# Patient Record
Sex: MALE | Race: WHITE | URBAN - METROPOLITAN AREA
[De-identification: names, ages, dates, MRNs, and addresses within clinical notes are randomized per-mention and may not be internally consistent; named-entity substitution may affect disease eponyms.]

---

## 2017-01-01 ENCOUNTER — INPATIENT (INPATIENT)
Facility: HOSPITAL | Age: 72
LOS: 3 days | DRG: 23 | End: 2017-09-05
Attending: INTERNAL MEDICINE | Admitting: NEUROLOGICAL SURGERY
Payer: COMMERCIAL

## 2017-01-01 ENCOUNTER — EMERGENCY (EMERGENCY)
Facility: HOSPITAL | Age: 72
LOS: 1 days | Discharge: HOME | End: 2017-01-01
Admitting: EMERGENCY MEDICINE
Payer: COMMERCIAL

## 2017-01-01 VITALS
HEART RATE: 116 BPM | OXYGEN SATURATION: 96 % | SYSTOLIC BLOOD PRESSURE: 173 MMHG | TEMPERATURE: 98 F | DIASTOLIC BLOOD PRESSURE: 101 MMHG | RESPIRATION RATE: 28 BRPM

## 2017-01-01 VITALS
OXYGEN SATURATION: 85 % | TEMPERATURE: 99 F | DIASTOLIC BLOOD PRESSURE: 74 MMHG | RESPIRATION RATE: 16 BRPM | HEART RATE: 113 BPM | SYSTOLIC BLOOD PRESSURE: 125 MMHG

## 2017-01-01 DIAGNOSIS — I61.9 NONTRAUMATIC INTRACEREBRAL HEMORRHAGE, UNSPECIFIED: ICD-10-CM

## 2017-01-01 DIAGNOSIS — Z71.89 OTHER SPECIFIED COUNSELING: ICD-10-CM

## 2017-01-01 DIAGNOSIS — K11.7 DISTURBANCES OF SALIVARY SECRETION: ICD-10-CM

## 2017-01-01 DIAGNOSIS — R53.81 OTHER MALAISE: ICD-10-CM

## 2017-01-01 DIAGNOSIS — G20 PARKINSON'S DISEASE: ICD-10-CM

## 2017-01-01 DIAGNOSIS — Z51.5 ENCOUNTER FOR PALLIATIVE CARE: ICD-10-CM

## 2017-01-01 LAB
ALBUMIN SERPL ELPH-MCNC: 3.7 G/DL — SIGNIFICANT CHANGE UP (ref 3.3–5)
ALBUMIN SERPL ELPH-MCNC: 4.5 G/DL — SIGNIFICANT CHANGE UP (ref 3.3–5)
ALP SERPL-CCNC: 69 U/L — SIGNIFICANT CHANGE UP (ref 40–120)
ALP SERPL-CCNC: 76 U/L — SIGNIFICANT CHANGE UP (ref 40–120)
ALT FLD-CCNC: 20 U/L RC — SIGNIFICANT CHANGE UP (ref 10–45)
ALT FLD-CCNC: 20 U/L RC — SIGNIFICANT CHANGE UP (ref 10–45)
ANION GAP SERPL CALC-SCNC: 16 MMOL/L — SIGNIFICANT CHANGE UP (ref 5–17)
ANION GAP SERPL CALC-SCNC: 16 MMOL/L — SIGNIFICANT CHANGE UP (ref 5–17)
ANION GAP SERPL CALC-SCNC: 19 MMOL/L — HIGH (ref 5–17)
APPEARANCE UR: CLEAR — SIGNIFICANT CHANGE UP
APTT BLD: 27 SEC — LOW (ref 27.5–37.4)
AST SERPL-CCNC: 24 U/L — SIGNIFICANT CHANGE UP (ref 10–40)
AST SERPL-CCNC: 25 U/L — SIGNIFICANT CHANGE UP (ref 10–40)
BASOPHILS # BLD AUTO: 0 K/UL — SIGNIFICANT CHANGE UP (ref 0–0.2)
BASOPHILS # BLD AUTO: 0 K/UL — SIGNIFICANT CHANGE UP (ref 0–0.2)
BASOPHILS NFR BLD AUTO: 0.2 % — SIGNIFICANT CHANGE UP (ref 0–2)
BASOPHILS NFR BLD AUTO: 0.3 % — SIGNIFICANT CHANGE UP (ref 0–2)
BILIRUB SERPL-MCNC: 0.7 MG/DL — SIGNIFICANT CHANGE UP (ref 0.2–1.2)
BILIRUB SERPL-MCNC: 0.7 MG/DL — SIGNIFICANT CHANGE UP (ref 0.2–1.2)
BILIRUB UR-MCNC: NEGATIVE — SIGNIFICANT CHANGE UP
BLD GP AB SCN SERPL QL: NEGATIVE — SIGNIFICANT CHANGE UP
BUN SERPL-MCNC: 18 MG/DL — SIGNIFICANT CHANGE UP (ref 7–23)
BUN SERPL-MCNC: 19 MG/DL — SIGNIFICANT CHANGE UP (ref 7–23)
BUN SERPL-MCNC: 19 MG/DL — SIGNIFICANT CHANGE UP (ref 7–23)
CALCIUM SERPL-MCNC: 8.5 MG/DL — SIGNIFICANT CHANGE UP (ref 8.4–10.5)
CALCIUM SERPL-MCNC: 8.8 MG/DL — SIGNIFICANT CHANGE UP (ref 8.4–10.5)
CALCIUM SERPL-MCNC: 8.8 MG/DL — SIGNIFICANT CHANGE UP (ref 8.4–10.5)
CHLORIDE SERPL-SCNC: 104 MMOL/L — SIGNIFICANT CHANGE UP (ref 96–108)
CHLORIDE SERPL-SCNC: 104 MMOL/L — SIGNIFICANT CHANGE UP (ref 96–108)
CHLORIDE SERPL-SCNC: 107 MMOL/L — SIGNIFICANT CHANGE UP (ref 96–108)
CHOLEST SERPL-MCNC: 183 MG/DL — SIGNIFICANT CHANGE UP (ref 10–199)
CO2 SERPL-SCNC: 16 MMOL/L — LOW (ref 22–31)
CO2 SERPL-SCNC: 22 MMOL/L — SIGNIFICANT CHANGE UP (ref 22–31)
CO2 SERPL-SCNC: 23 MMOL/L — SIGNIFICANT CHANGE UP (ref 22–31)
COLOR SPEC: SIGNIFICANT CHANGE UP
CREAT SERPL-MCNC: 1.04 MG/DL — SIGNIFICANT CHANGE UP (ref 0.5–1.3)
CREAT SERPL-MCNC: 1.1 MG/DL — SIGNIFICANT CHANGE UP (ref 0.5–1.3)
CREAT SERPL-MCNC: 1.11 MG/DL — SIGNIFICANT CHANGE UP (ref 0.5–1.3)
CULTURE RESULTS: SIGNIFICANT CHANGE UP
DIFF PNL FLD: ABNORMAL
EOSINOPHIL # BLD AUTO: 0 K/UL — SIGNIFICANT CHANGE UP (ref 0–0.5)
EOSINOPHIL # BLD AUTO: 0.1 K/UL — SIGNIFICANT CHANGE UP (ref 0–0.5)
EOSINOPHIL NFR BLD AUTO: 0.1 % — SIGNIFICANT CHANGE UP (ref 0–6)
EOSINOPHIL NFR BLD AUTO: 0.4 % — SIGNIFICANT CHANGE UP (ref 0–6)
GAS PNL BLDA: SIGNIFICANT CHANGE UP
GLUCOSE SERPL-MCNC: 110 MG/DL — HIGH (ref 70–99)
GLUCOSE SERPL-MCNC: 149 MG/DL — HIGH (ref 70–99)
GLUCOSE SERPL-MCNC: 161 MG/DL — HIGH (ref 70–99)
GLUCOSE UR QL: NEGATIVE — SIGNIFICANT CHANGE UP
GRAM STN FLD: SIGNIFICANT CHANGE UP
HBA1C BLD-MCNC: 6.4 % — HIGH (ref 4–5.6)
HCT VFR BLD CALC: 50.8 % — HIGH (ref 39–50)
HCT VFR BLD CALC: 54.1 % — HIGH (ref 39–50)
HDLC SERPL-MCNC: 52 MG/DL — SIGNIFICANT CHANGE UP (ref 40–125)
HGB BLD-MCNC: 16.6 G/DL — SIGNIFICANT CHANGE UP (ref 13–17)
HGB BLD-MCNC: 18.1 G/DL — HIGH (ref 13–17)
INR BLD: 1.16 RATIO — SIGNIFICANT CHANGE UP (ref 0.88–1.16)
KETONES UR-MCNC: NEGATIVE — SIGNIFICANT CHANGE UP
LEUKOCYTE ESTERASE UR-ACNC: NEGATIVE — SIGNIFICANT CHANGE UP
LIPID PNL WITH DIRECT LDL SERPL: 111 MG/DL — SIGNIFICANT CHANGE UP
LYMPHOCYTES # BLD AUTO: 1.8 K/UL — SIGNIFICANT CHANGE UP (ref 1–3.3)
LYMPHOCYTES # BLD AUTO: 1.9 K/UL — SIGNIFICANT CHANGE UP (ref 1–3.3)
LYMPHOCYTES # BLD AUTO: 10.8 % — LOW (ref 13–44)
LYMPHOCYTES # BLD AUTO: 11.1 % — LOW (ref 13–44)
MAGNESIUM SERPL-MCNC: 1.8 MG/DL — SIGNIFICANT CHANGE UP (ref 1.6–2.6)
MCHC RBC-ENTMCNC: 31.3 PG — SIGNIFICANT CHANGE UP (ref 27–34)
MCHC RBC-ENTMCNC: 31.8 PG — SIGNIFICANT CHANGE UP (ref 27–34)
MCHC RBC-ENTMCNC: 32.7 GM/DL — SIGNIFICANT CHANGE UP (ref 32–36)
MCHC RBC-ENTMCNC: 33.4 GM/DL — SIGNIFICANT CHANGE UP (ref 32–36)
MCV RBC AUTO: 95.1 FL — SIGNIFICANT CHANGE UP (ref 80–100)
MCV RBC AUTO: 95.8 FL — SIGNIFICANT CHANGE UP (ref 80–100)
MONOCYTES # BLD AUTO: 1.1 K/UL — HIGH (ref 0–0.9)
MONOCYTES # BLD AUTO: 1.4 K/UL — HIGH (ref 0–0.9)
MONOCYTES NFR BLD AUTO: 6.4 % — SIGNIFICANT CHANGE UP (ref 2–14)
MONOCYTES NFR BLD AUTO: 8.8 % — SIGNIFICANT CHANGE UP (ref 2–14)
NEUTROPHILS # BLD AUTO: 12.9 K/UL — HIGH (ref 1.8–7.4)
NEUTROPHILS # BLD AUTO: 14.5 K/UL — HIGH (ref 1.8–7.4)
NEUTROPHILS NFR BLD AUTO: 79.4 % — HIGH (ref 43–77)
NEUTROPHILS NFR BLD AUTO: 82.5 % — HIGH (ref 43–77)
NITRITE UR-MCNC: NEGATIVE — SIGNIFICANT CHANGE UP
PH UR: 6 — SIGNIFICANT CHANGE UP (ref 5–8)
PHOSPHATE SERPL-MCNC: 3.3 MG/DL — SIGNIFICANT CHANGE UP (ref 2.5–4.5)
PLATELET # BLD AUTO: 170 K/UL — SIGNIFICANT CHANGE UP (ref 150–400)
PLATELET # BLD AUTO: 171 K/UL — SIGNIFICANT CHANGE UP (ref 150–400)
POTASSIUM SERPL-MCNC: 3.5 MMOL/L — SIGNIFICANT CHANGE UP (ref 3.5–5.3)
POTASSIUM SERPL-MCNC: 4.4 MMOL/L — SIGNIFICANT CHANGE UP (ref 3.5–5.3)
POTASSIUM SERPL-MCNC: 4.8 MMOL/L — SIGNIFICANT CHANGE UP (ref 3.5–5.3)
POTASSIUM SERPL-SCNC: 3.5 MMOL/L — SIGNIFICANT CHANGE UP (ref 3.5–5.3)
POTASSIUM SERPL-SCNC: 4.4 MMOL/L — SIGNIFICANT CHANGE UP (ref 3.5–5.3)
POTASSIUM SERPL-SCNC: 4.8 MMOL/L — SIGNIFICANT CHANGE UP (ref 3.5–5.3)
PROT SERPL-MCNC: 7.3 G/DL — SIGNIFICANT CHANGE UP (ref 6–8.3)
PROT SERPL-MCNC: 7.8 G/DL — SIGNIFICANT CHANGE UP (ref 6–8.3)
PROT UR-MCNC: 30 MG/DL
PROTHROM AB SERPL-ACNC: 12.6 SEC — SIGNIFICANT CHANGE UP (ref 9.8–12.7)
RBC # BLD: 5.3 M/UL — SIGNIFICANT CHANGE UP (ref 4.2–5.8)
RBC # BLD: 5.69 M/UL — SIGNIFICANT CHANGE UP (ref 4.2–5.8)
RBC # FLD: 11.9 % — SIGNIFICANT CHANGE UP (ref 10.3–14.5)
RBC # FLD: 11.9 % — SIGNIFICANT CHANGE UP (ref 10.3–14.5)
RH IG SCN BLD-IMP: NEGATIVE — SIGNIFICANT CHANGE UP
RH IG SCN BLD-IMP: NEGATIVE — SIGNIFICANT CHANGE UP
SODIUM SERPL-SCNC: 139 MMOL/L — SIGNIFICANT CHANGE UP (ref 135–145)
SODIUM SERPL-SCNC: 143 MMOL/L — SIGNIFICANT CHANGE UP (ref 135–145)
SODIUM SERPL-SCNC: 145 MMOL/L — SIGNIFICANT CHANGE UP (ref 135–145)
SP GR SPEC: >1.03 — HIGH (ref 1.01–1.02)
SPECIMEN SOURCE: SIGNIFICANT CHANGE UP
SPECIMEN SOURCE: SIGNIFICANT CHANGE UP
TOTAL CHOLESTEROL/HDL RATIO MEASUREMENT: 3.5 RATIO — SIGNIFICANT CHANGE UP (ref 3.4–9.6)
TRIGL SERPL-MCNC: 100 MG/DL — SIGNIFICANT CHANGE UP (ref 10–149)
TROPONIN T SERPL-MCNC: <0.01 NG/ML — SIGNIFICANT CHANGE UP (ref 0–0.06)
UROBILINOGEN FLD QL: NEGATIVE — SIGNIFICANT CHANGE UP
WBC # BLD: 16.2 K/UL — HIGH (ref 3.8–10.5)
WBC # BLD: 17.6 K/UL — HIGH (ref 3.8–10.5)
WBC # FLD AUTO: 16.2 K/UL — HIGH (ref 3.8–10.5)
WBC # FLD AUTO: 17.6 K/UL — HIGH (ref 3.8–10.5)

## 2017-01-01 PROCEDURE — 85610 PROTHROMBIN TIME: CPT

## 2017-01-01 PROCEDURE — 71045 X-RAY EXAM CHEST 1 VIEW: CPT

## 2017-01-01 PROCEDURE — 97760 ORTHOTIC MGMT&TRAING 1ST ENC: CPT

## 2017-01-01 PROCEDURE — 70450 CT HEAD/BRAIN W/O DYE: CPT | Mod: 26,77

## 2017-01-01 PROCEDURE — 80048 BASIC METABOLIC PNL TOTAL CA: CPT

## 2017-01-01 PROCEDURE — 99253 IP/OBS CNSLTJ NEW/EST LOW 45: CPT

## 2017-01-01 PROCEDURE — 99223 1ST HOSP IP/OBS HIGH 75: CPT | Mod: GC

## 2017-01-01 PROCEDURE — 70450 CT HEAD/BRAIN W/O DYE: CPT

## 2017-01-01 PROCEDURE — 99291 CRITICAL CARE FIRST HOUR: CPT

## 2017-01-01 PROCEDURE — 81003 URINALYSIS AUTO W/O SCOPE: CPT

## 2017-01-01 PROCEDURE — 31500 INSERT EMERGENCY AIRWAY: CPT

## 2017-01-01 PROCEDURE — 87070 CULTURE OTHR SPECIMN AEROBIC: CPT

## 2017-01-01 PROCEDURE — 86850 RBC ANTIBODY SCREEN: CPT

## 2017-01-01 PROCEDURE — 71010: CPT | Mod: 26

## 2017-01-01 PROCEDURE — 51702 INSERT TEMP BLADDER CATH: CPT | Mod: XU

## 2017-01-01 PROCEDURE — 96374 THER/PROPH/DIAG INJ IV PUSH: CPT | Mod: XU

## 2017-01-01 PROCEDURE — 94002 VENT MGMT INPAT INIT DAY: CPT

## 2017-01-01 PROCEDURE — 83036 HEMOGLOBIN GLYCOSYLATED A1C: CPT

## 2017-01-01 PROCEDURE — 94799 UNLISTED PULMONARY SVC/PX: CPT

## 2017-01-01 PROCEDURE — 96375 TX/PRO/DX INJ NEW DRUG ADDON: CPT | Mod: XU

## 2017-01-01 PROCEDURE — 87086 URINE CULTURE/COLONY COUNT: CPT

## 2017-01-01 PROCEDURE — 93005 ELECTROCARDIOGRAM TRACING: CPT

## 2017-01-01 PROCEDURE — 71010: CPT | Mod: 26,76

## 2017-01-01 PROCEDURE — 86900 BLOOD TYPING SEROLOGIC ABO: CPT

## 2017-01-01 PROCEDURE — 72125 CT NECK SPINE W/O DYE: CPT | Mod: 26

## 2017-01-01 PROCEDURE — 85730 THROMBOPLASTIN TIME PARTIAL: CPT

## 2017-01-01 PROCEDURE — 70496 CT ANGIOGRAPHY HEAD: CPT | Mod: 26

## 2017-01-01 PROCEDURE — 82947 ASSAY GLUCOSE BLOOD QUANT: CPT

## 2017-01-01 PROCEDURE — 85027 COMPLETE CBC AUTOMATED: CPT

## 2017-01-01 PROCEDURE — 83735 ASSAY OF MAGNESIUM: CPT

## 2017-01-01 PROCEDURE — 99233 SBSQ HOSP IP/OBS HIGH 50: CPT | Mod: GC

## 2017-01-01 PROCEDURE — 81001 URINALYSIS AUTO W/SCOPE: CPT

## 2017-01-01 PROCEDURE — 82962 GLUCOSE BLOOD TEST: CPT

## 2017-01-01 PROCEDURE — 84295 ASSAY OF SERUM SODIUM: CPT

## 2017-01-01 PROCEDURE — 93010 ELECTROCARDIOGRAM REPORT: CPT

## 2017-01-01 PROCEDURE — 93306 TTE W/DOPPLER COMPLETE: CPT

## 2017-01-01 PROCEDURE — 80061 LIPID PANEL: CPT

## 2017-01-01 PROCEDURE — 94003 VENT MGMT INPAT SUBQ DAY: CPT

## 2017-01-01 PROCEDURE — 83605 ASSAY OF LACTIC ACID: CPT

## 2017-01-01 PROCEDURE — 86901 BLOOD TYPING SEROLOGIC RH(D): CPT

## 2017-01-01 PROCEDURE — 87040 BLOOD CULTURE FOR BACTERIA: CPT

## 2017-01-01 PROCEDURE — 82330 ASSAY OF CALCIUM: CPT

## 2017-01-01 PROCEDURE — 82435 ASSAY OF BLOOD CHLORIDE: CPT

## 2017-01-01 PROCEDURE — 99291 CRITICAL CARE FIRST HOUR: CPT | Mod: 25

## 2017-01-01 PROCEDURE — 70498 CT ANGIOGRAPHY NECK: CPT

## 2017-01-01 PROCEDURE — 70450 CT HEAD/BRAIN W/O DYE: CPT | Mod: 26

## 2017-01-01 PROCEDURE — 82803 BLOOD GASES ANY COMBINATION: CPT

## 2017-01-01 PROCEDURE — 80053 COMPREHEN METABOLIC PANEL: CPT

## 2017-01-01 PROCEDURE — 84484 ASSAY OF TROPONIN QUANT: CPT

## 2017-01-01 PROCEDURE — 72125 CT NECK SPINE W/O DYE: CPT

## 2017-01-01 PROCEDURE — 99231 SBSQ HOSP IP/OBS SF/LOW 25: CPT

## 2017-01-01 PROCEDURE — 84132 ASSAY OF SERUM POTASSIUM: CPT

## 2017-01-01 PROCEDURE — 84100 ASSAY OF PHOSPHORUS: CPT

## 2017-01-01 PROCEDURE — 93306 TTE W/DOPPLER COMPLETE: CPT | Mod: 26

## 2017-01-01 PROCEDURE — 85014 HEMATOCRIT: CPT

## 2017-01-01 PROCEDURE — 70498 CT ANGIOGRAPHY NECK: CPT | Mod: 26

## 2017-01-01 PROCEDURE — 70496 CT ANGIOGRAPHY HEAD: CPT

## 2017-01-01 RX ORDER — ONDANSETRON 8 MG/1
4 TABLET, FILM COATED ORAL EVERY 6 HOURS
Qty: 0 | Refills: 0 | Status: DISCONTINUED | OUTPATIENT
Start: 2017-01-01 | End: 2017-01-01

## 2017-01-01 RX ORDER — MORPHINE SULFATE 50 MG/1
1 CAPSULE, EXTENDED RELEASE ORAL
Qty: 100 | Refills: 0 | Status: DISCONTINUED | OUTPATIENT
Start: 2017-01-01 | End: 2017-01-01

## 2017-01-01 RX ORDER — CARBIDOPA AND LEVODOPA 25; 100 MG/1; MG/1
62.5 TABLET ORAL
Qty: 0 | Refills: 0 | Status: DISCONTINUED | OUTPATIENT
Start: 2017-01-01 | End: 2017-01-01

## 2017-01-01 RX ORDER — DEXTROSE 50 % IN WATER 50 %
25 SYRINGE (ML) INTRAVENOUS ONCE
Qty: 0 | Refills: 0 | Status: DISCONTINUED | OUTPATIENT
Start: 2017-01-01 | End: 2017-01-01

## 2017-01-01 RX ORDER — CARBIDOPA AND LEVODOPA 25; 100 MG/1; MG/1
0.25 TABLET ORAL
Qty: 0 | Refills: 0 | Status: DISCONTINUED | OUTPATIENT
Start: 2017-01-01 | End: 2017-01-01

## 2017-01-01 RX ORDER — LABETALOL HCL 100 MG
100 TABLET ORAL EVERY 8 HOURS
Qty: 0 | Refills: 0 | Status: DISCONTINUED | OUTPATIENT
Start: 2017-01-01 | End: 2017-01-01

## 2017-01-01 RX ORDER — ACETAMINOPHEN 500 MG
1000 TABLET ORAL ONCE
Qty: 0 | Refills: 0 | Status: COMPLETED | OUTPATIENT
Start: 2017-01-01 | End: 2017-01-01

## 2017-01-01 RX ORDER — MORPHINE SULFATE 50 MG/1
2 CAPSULE, EXTENDED RELEASE ORAL ONCE
Qty: 0 | Refills: 0 | Status: DISCONTINUED | OUTPATIENT
Start: 2017-01-01 | End: 2017-01-01

## 2017-01-01 RX ORDER — GLUCAGON INJECTION, SOLUTION 0.5 MG/.1ML
1 INJECTION, SOLUTION SUBCUTANEOUS ONCE
Qty: 0 | Refills: 0 | Status: DISCONTINUED | OUTPATIENT
Start: 2017-01-01 | End: 2017-01-01

## 2017-01-01 RX ORDER — ACETAMINOPHEN 500 MG
905 TABLET ORAL EVERY 6 HOURS
Qty: 0 | Refills: 0 | Status: DISCONTINUED | OUTPATIENT
Start: 2017-01-01 | End: 2017-01-01

## 2017-01-01 RX ORDER — PANTOPRAZOLE SODIUM 20 MG/1
40 TABLET, DELAYED RELEASE ORAL DAILY
Qty: 0 | Refills: 0 | Status: DISCONTINUED | OUTPATIENT
Start: 2017-01-01 | End: 2017-01-01

## 2017-01-01 RX ORDER — DEXTROSE MONOHYDRATE, SODIUM CHLORIDE, AND POTASSIUM CHLORIDE 50; .745; 4.5 G/1000ML; G/1000ML; G/1000ML
1000 INJECTION, SOLUTION INTRAVENOUS
Qty: 0 | Refills: 0 | Status: DISCONTINUED | OUTPATIENT
Start: 2017-01-01 | End: 2017-01-01

## 2017-01-01 RX ORDER — CHLORHEXIDINE GLUCONATE 213 G/1000ML
15 SOLUTION TOPICAL
Qty: 0 | Refills: 0 | Status: DISCONTINUED | OUTPATIENT
Start: 2017-01-01 | End: 2017-01-01

## 2017-01-01 RX ORDER — LEVETIRACETAM 250 MG/1
500 TABLET, FILM COATED ORAL EVERY 12 HOURS
Qty: 0 | Refills: 0 | Status: DISCONTINUED | OUTPATIENT
Start: 2017-01-01 | End: 2017-01-01

## 2017-01-01 RX ORDER — SENNA PLUS 8.6 MG/1
2 TABLET ORAL AT BEDTIME
Qty: 0 | Refills: 0 | Status: DISCONTINUED | OUTPATIENT
Start: 2017-01-01 | End: 2017-01-01

## 2017-01-01 RX ORDER — FENTANYL CITRATE 50 UG/ML
25 INJECTION INTRAVENOUS ONCE
Qty: 0 | Refills: 0 | Status: DISCONTINUED | OUTPATIENT
Start: 2017-01-01 | End: 2017-01-01

## 2017-01-01 RX ORDER — ACETAMINOPHEN 500 MG
650 TABLET ORAL EVERY 6 HOURS
Qty: 0 | Refills: 0 | Status: DISCONTINUED | OUTPATIENT
Start: 2017-01-01 | End: 2017-01-01

## 2017-01-01 RX ORDER — SODIUM CHLORIDE 9 MG/ML
1000 INJECTION INTRAMUSCULAR; INTRAVENOUS; SUBCUTANEOUS
Qty: 0 | Refills: 0 | Status: DISCONTINUED | OUTPATIENT
Start: 2017-01-01 | End: 2017-01-01

## 2017-01-01 RX ORDER — CEFAZOLIN SODIUM 1 G
2000 VIAL (EA) INJECTION ONCE
Qty: 0 | Refills: 0 | Status: COMPLETED | OUTPATIENT
Start: 2017-01-01 | End: 2017-01-01

## 2017-01-01 RX ORDER — SODIUM CHLORIDE 5 G/100ML
1000 INJECTION, SOLUTION INTRAVENOUS
Qty: 0 | Refills: 0 | Status: DISCONTINUED | OUTPATIENT
Start: 2017-01-01 | End: 2017-01-01

## 2017-01-01 RX ORDER — ROBINUL 0.2 MG/ML
0.2 INJECTION INTRAMUSCULAR; INTRAVENOUS EVERY 6 HOURS
Qty: 0 | Refills: 0 | Status: DISCONTINUED | OUTPATIENT
Start: 2017-01-01 | End: 2017-01-01

## 2017-01-01 RX ORDER — FENTANYL CITRATE 50 UG/ML
25 INJECTION INTRAVENOUS
Qty: 0 | Refills: 0 | Status: DISCONTINUED | OUTPATIENT
Start: 2017-01-01 | End: 2017-01-01

## 2017-01-01 RX ORDER — POTASSIUM CHLORIDE 20 MEQ
40 PACKET (EA) ORAL ONCE
Qty: 0 | Refills: 0 | Status: COMPLETED | OUTPATIENT
Start: 2017-01-01 | End: 2017-01-01

## 2017-01-01 RX ORDER — PROPOFOL 10 MG/ML
5 INJECTION, EMULSION INTRAVENOUS
Qty: 500 | Refills: 0 | Status: DISCONTINUED | OUTPATIENT
Start: 2017-01-01 | End: 2017-01-01

## 2017-01-01 RX ORDER — ROBINUL 0.2 MG/ML
0.2 INJECTION INTRAMUSCULAR; INTRAVENOUS EVERY 4 HOURS
Qty: 0 | Refills: 0 | Status: DISCONTINUED | OUTPATIENT
Start: 2017-01-01 | End: 2017-01-01

## 2017-01-01 RX ORDER — CARBIDOPA AND LEVODOPA 25; 100 MG/1; MG/1
0.5 TABLET ORAL
Qty: 0 | Refills: 0 | Status: DISCONTINUED | OUTPATIENT
Start: 2017-01-01 | End: 2017-01-01

## 2017-01-01 RX ORDER — MORPHINE SULFATE 50 MG/1
6 CAPSULE, EXTENDED RELEASE ORAL
Qty: 0 | Refills: 0 | Status: DISCONTINUED | OUTPATIENT
Start: 2017-01-01 | End: 2017-01-01

## 2017-01-01 RX ORDER — INSULIN LISPRO 100/ML
VIAL (ML) SUBCUTANEOUS EVERY 6 HOURS
Qty: 0 | Refills: 0 | Status: DISCONTINUED | OUTPATIENT
Start: 2017-01-01 | End: 2017-01-01

## 2017-01-01 RX ORDER — DEXTROSE 50 % IN WATER 50 %
12.5 SYRINGE (ML) INTRAVENOUS ONCE
Qty: 0 | Refills: 0 | Status: DISCONTINUED | OUTPATIENT
Start: 2017-01-01 | End: 2017-01-01

## 2017-01-01 RX ORDER — MIDAZOLAM HYDROCHLORIDE 1 MG/ML
1 INJECTION, SOLUTION INTRAMUSCULAR; INTRAVENOUS ONCE
Qty: 0 | Refills: 0 | Status: DISCONTINUED | OUTPATIENT
Start: 2017-01-01 | End: 2017-01-01

## 2017-01-01 RX ORDER — SODIUM CHLORIDE 9 MG/ML
1000 INJECTION, SOLUTION INTRAVENOUS
Qty: 0 | Refills: 0 | Status: DISCONTINUED | OUTPATIENT
Start: 2017-01-01 | End: 2017-01-01

## 2017-01-01 RX ORDER — DEXTROSE 50 % IN WATER 50 %
1 SYRINGE (ML) INTRAVENOUS ONCE
Qty: 0 | Refills: 0 | Status: DISCONTINUED | OUTPATIENT
Start: 2017-01-01 | End: 2017-01-01

## 2017-01-01 RX ORDER — DEXMEDETOMIDINE HYDROCHLORIDE IN 0.9% SODIUM CHLORIDE 4 UG/ML
0.4 INJECTION INTRAVENOUS
Qty: 200 | Refills: 0 | Status: DISCONTINUED | OUTPATIENT
Start: 2017-01-01 | End: 2017-01-01

## 2017-01-01 RX ORDER — LABETALOL HCL 100 MG
2 TABLET ORAL
Qty: 200 | Refills: 0 | Status: DISCONTINUED | OUTPATIENT
Start: 2017-01-01 | End: 2017-01-01

## 2017-01-01 RX ORDER — MORPHINE SULFATE 50 MG/1
3 CAPSULE, EXTENDED RELEASE ORAL
Qty: 100 | Refills: 0 | Status: DISCONTINUED | OUTPATIENT
Start: 2017-01-01 | End: 2017-01-01

## 2017-01-01 RX ORDER — DOCUSATE SODIUM 100 MG
100 CAPSULE ORAL THREE TIMES A DAY
Qty: 0 | Refills: 0 | Status: DISCONTINUED | OUTPATIENT
Start: 2017-01-01 | End: 2017-01-01

## 2017-01-01 RX ORDER — SODIUM CHLORIDE 9 MG/ML
250 INJECTION INTRAMUSCULAR; INTRAVENOUS; SUBCUTANEOUS ONCE
Qty: 0 | Refills: 0 | Status: COMPLETED | OUTPATIENT
Start: 2017-01-01 | End: 2017-01-01

## 2017-01-01 RX ORDER — CARBIDOPA AND LEVODOPA 25; 100 MG/1; MG/1
2.5 TABLET ORAL
Qty: 0 | Refills: 0 | Status: DISCONTINUED | OUTPATIENT
Start: 2017-01-01 | End: 2017-01-01

## 2017-01-01 RX ORDER — ROBINUL 0.2 MG/ML
0.4 INJECTION INTRAMUSCULAR; INTRAVENOUS EVERY 6 HOURS
Qty: 0 | Refills: 0 | Status: DISCONTINUED | OUTPATIENT
Start: 2017-01-01 | End: 2017-01-01

## 2017-01-01 RX ORDER — NICARDIPINE HYDROCHLORIDE 30 MG/1
5 CAPSULE, EXTENDED RELEASE ORAL
Qty: 40 | Refills: 0 | Status: DISCONTINUED | OUTPATIENT
Start: 2017-01-01 | End: 2017-01-01

## 2017-01-01 RX ADMIN — ROBINUL 0.2 MILLIGRAM(S): 0.2 INJECTION INTRAMUSCULAR; INTRAVENOUS at 18:31

## 2017-01-01 RX ADMIN — MORPHINE SULFATE 6 MILLIGRAM(S): 50 CAPSULE, EXTENDED RELEASE ORAL at 18:01

## 2017-01-01 RX ADMIN — ROBINUL 0.4 MILLIGRAM(S): 0.2 INJECTION INTRAMUSCULAR; INTRAVENOUS at 23:55

## 2017-01-01 RX ADMIN — Medication 100 MILLIGRAM(S): at 14:47

## 2017-01-01 RX ADMIN — Medication 1 DROP(S): at 05:47

## 2017-01-01 RX ADMIN — MORPHINE SULFATE 3 MG/HR: 50 CAPSULE, EXTENDED RELEASE ORAL at 18:29

## 2017-01-01 RX ADMIN — Medication 1 DROP(S): at 14:48

## 2017-01-01 RX ADMIN — Medication 1000 MILLIGRAM(S): at 19:33

## 2017-01-01 RX ADMIN — MIDAZOLAM HYDROCHLORIDE 1 MILLIGRAM(S): 1 INJECTION, SOLUTION INTRAMUSCULAR; INTRAVENOUS at 18:31

## 2017-01-01 RX ADMIN — MORPHINE SULFATE 3 MG/HR: 50 CAPSULE, EXTENDED RELEASE ORAL at 16:07

## 2017-01-01 RX ADMIN — Medication 1 DROP(S): at 21:20

## 2017-01-01 RX ADMIN — MORPHINE SULFATE 2 MILLIGRAM(S): 50 CAPSULE, EXTENDED RELEASE ORAL at 18:31

## 2017-01-01 RX ADMIN — MORPHINE SULFATE 1 MG/HR: 50 CAPSULE, EXTENDED RELEASE ORAL at 18:41

## 2017-01-01 RX ADMIN — DEXMEDETOMIDINE HYDROCHLORIDE IN 0.9% SODIUM CHLORIDE 6 MICROGRAM(S)/KG/HR: 4 INJECTION INTRAVENOUS at 16:54

## 2017-01-01 RX ADMIN — ROBINUL 0.4 MILLIGRAM(S): 0.2 INJECTION INTRAMUSCULAR; INTRAVENOUS at 14:57

## 2017-01-01 RX ADMIN — ROBINUL 0.4 MILLIGRAM(S): 0.2 INJECTION INTRAMUSCULAR; INTRAVENOUS at 05:46

## 2017-01-01 RX ADMIN — SODIUM CHLORIDE 75 MILLILITER(S): 5 INJECTION, SOLUTION INTRAVENOUS at 14:47

## 2017-01-01 RX ADMIN — Medication 1 DROP(S): at 21:29

## 2017-01-01 RX ADMIN — CARBIDOPA AND LEVODOPA 0.5 TABLET(S): 25; 100 TABLET ORAL at 17:10

## 2017-01-01 RX ADMIN — CHLORHEXIDINE GLUCONATE 15 MILLILITER(S): 213 SOLUTION TOPICAL at 04:59

## 2017-01-01 RX ADMIN — Medication 650 MILLIGRAM(S): at 18:29

## 2017-01-01 RX ADMIN — Medication 1 DROP(S): at 05:08

## 2017-01-01 RX ADMIN — MORPHINE SULFATE 6 MILLIGRAM(S): 50 CAPSULE, EXTENDED RELEASE ORAL at 19:55

## 2017-01-01 RX ADMIN — CHLORHEXIDINE GLUCONATE 15 MILLILITER(S): 213 SOLUTION TOPICAL at 22:47

## 2017-01-01 RX ADMIN — Medication 1 DROP(S): at 14:57

## 2017-01-01 RX ADMIN — Medication 0.5 MILLIGRAM(S): at 06:11

## 2017-01-01 RX ADMIN — LEVETIRACETAM 400 MILLIGRAM(S): 250 TABLET, FILM COATED ORAL at 22:47

## 2017-01-01 RX ADMIN — Medication 800 MILLIGRAM(S): at 19:03

## 2017-01-01 RX ADMIN — MORPHINE SULFATE 6 MILLIGRAM(S): 50 CAPSULE, EXTENDED RELEASE ORAL at 09:09

## 2017-01-01 RX ADMIN — Medication 1 DROP(S): at 14:55

## 2017-01-01 RX ADMIN — SODIUM CHLORIDE 500 MILLILITER(S): 9 INJECTION INTRAMUSCULAR; INTRAVENOUS; SUBCUTANEOUS at 00:25

## 2017-01-01 RX ADMIN — MORPHINE SULFATE 6 MILLIGRAM(S): 50 CAPSULE, EXTENDED RELEASE ORAL at 21:26

## 2017-01-01 RX ADMIN — Medication 40 MILLIEQUIVALENT(S): at 22:47

## 2017-01-01 RX ADMIN — MORPHINE SULFATE 1 MG/HR: 50 CAPSULE, EXTENDED RELEASE ORAL at 18:30

## 2017-01-01 RX ADMIN — FENTANYL CITRATE 25 MICROGRAM(S): 50 INJECTION INTRAVENOUS at 16:19

## 2017-01-01 RX ADMIN — SODIUM CHLORIDE 10 MILLILITER(S): 9 INJECTION INTRAMUSCULAR; INTRAVENOUS; SUBCUTANEOUS at 19:16

## 2017-01-01 RX ADMIN — ROBINUL 0.4 MILLIGRAM(S): 0.2 INJECTION INTRAMUSCULAR; INTRAVENOUS at 17:19

## 2017-01-01 RX ADMIN — Medication 1 DROP(S): at 02:58

## 2017-01-01 RX ADMIN — PROPOFOL 1.81 MICROGRAM(S)/KG/MIN: 10 INJECTION, EMULSION INTRAVENOUS at 14:07

## 2017-01-01 RX ADMIN — ROBINUL 0.4 MILLIGRAM(S): 0.2 INJECTION INTRAMUSCULAR; INTRAVENOUS at 18:01

## 2017-01-01 RX ADMIN — Medication 1 DROP(S): at 17:10

## 2017-01-01 RX ADMIN — MORPHINE SULFATE 3 MG/HR: 50 CAPSULE, EXTENDED RELEASE ORAL at 19:15

## 2017-01-01 RX ADMIN — ROBINUL 0.4 MILLIGRAM(S): 0.2 INJECTION INTRAMUSCULAR; INTRAVENOUS at 12:45

## 2017-01-01 RX ADMIN — MORPHINE SULFATE 2 MILLIGRAM(S): 50 CAPSULE, EXTENDED RELEASE ORAL at 18:41

## 2017-01-01 RX ADMIN — Medication 0.5 MILLIGRAM(S): at 20:24

## 2017-01-01 RX ADMIN — MORPHINE SULFATE 3 MG/HR: 50 CAPSULE, EXTENDED RELEASE ORAL at 07:08

## 2017-01-01 RX ADMIN — PANTOPRAZOLE SODIUM 40 MILLIGRAM(S): 20 TABLET, DELAYED RELEASE ORAL at 17:10

## 2017-01-01 RX ADMIN — Medication 650 MILLIGRAM(S): at 16:07

## 2017-01-01 RX ADMIN — MORPHINE SULFATE 6 MILLIGRAM(S): 50 CAPSULE, EXTENDED RELEASE ORAL at 16:47

## 2017-01-01 RX ADMIN — Medication 100 MILLIGRAM(S): at 15:10

## 2017-01-01 RX ADMIN — FENTANYL CITRATE 25 MICROGRAM(S): 50 INJECTION INTRAVENOUS at 16:34

## 2017-01-01 RX ADMIN — LEVETIRACETAM 400 MILLIGRAM(S): 250 TABLET, FILM COATED ORAL at 05:08

## 2017-01-01 RX ADMIN — Medication 650 MILLIGRAM(S): at 05:55

## 2017-01-01 RX ADMIN — Medication 1 DROP(S): at 13:32

## 2017-01-01 RX ADMIN — ROBINUL 0.4 MILLIGRAM(S): 0.2 INJECTION INTRAMUSCULAR; INTRAVENOUS at 00:15

## 2017-01-01 RX ADMIN — Medication 1 DROP(S): at 22:56

## 2017-01-01 RX ADMIN — MORPHINE SULFATE 6 MILLIGRAM(S): 50 CAPSULE, EXTENDED RELEASE ORAL at 19:45

## 2017-01-01 RX ADMIN — Medication 120 MG/MIN: at 14:08

## 2017-01-01 RX ADMIN — MORPHINE SULFATE 6 MILLIGRAM(S): 50 CAPSULE, EXTENDED RELEASE ORAL at 21:36

## 2017-09-01 NOTE — ED PROVIDER NOTE - ATTENDING CONTRIBUTION TO CARE
I have seen and evaluated this patient with the resident.   I agree with the findings  unless other wise stated.  I have made appropriate changes in documentations where needed, After my face to face bedside evaluation, I am further  noting: Pt transfer from [facility] for evaluation of head bleed. As per EMS pt fell from beach chair to hard sand yesterday.  Starting around 2am pt became disoriented.  Pt intubated at [facility].  Pt unresponsive upon arrival.  Pt Receiving Propofol and Labetalol upon arrival.  Pt pupils asymmetrical.  Pt posturing to midline.  Pt has increased movement on left side when compared to right.  Pt nonverbal.  Pt has baker placed at [facility].  See neuro flow sheet in chart ventilator managed CTA head and neck will admit to neuro ICU-- Jose De Jesus

## 2017-09-01 NOTE — H&P ADULT - ATTENDING COMMENTS
I saw and evaluated the patient. I reviewed the resident's note and agree. 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 The patient is intubated, not opening eyes, localizing on the left, and extensor posturing on the right, with a GCS of 7T ([id number]). Emergent right external ventricular drain inserted. Recommend strict blood pressure control and care per NSCU.

## 2017-09-01 NOTE — PROCEDURE NOTE - NSPROCDETAILS_GEN_ALL_CORE
hemostasis with direct pressure, dressing applied/Seldinger technique/positive blood return obtained via catheter/sutured in place/all materials/supplies accounted for at end of procedure/location identified, draped/prepped, sterile technique used, needle inserted/introduced/connected to a pressurized flush line

## 2017-09-01 NOTE — H&P ADULT - NSHPPHYSICALEXAM_GEN_ALL_CORE
Unarousable. Does not open eyes to pain. Intubated, not sedated. Pupils 3mm, minimally reactive to light.   Extensor posturing in RUE. Localizes to pain in LUE. Triple flexing in b/l lower extremities.   + corneal, cough, and gag reflex.

## 2017-09-01 NOTE — ED PROVIDER NOTE - CRITICAL CARE PROVIDED
documentation/direct patient care (not related to procedure)/additional history taking/conducted a detailed discussion of DNR status/interpretation of diagnostic studies/consultation with other physicians/consult w/ pt's family directly relating to pts condition

## 2017-09-01 NOTE — DISCHARGE NOTE ADULT - PATIENT PORTAL LINK FT
“You can access the [facility] Patient Portal, offered by [facility], by registering with the following website: [email or web address]”

## 2017-09-01 NOTE — H&P ADULT - HISTORY OF PRESENT ILLNESS
This is a 71yo M from [facility] with PMHx of HTN and Parkinson's with b/l DBS who was transferred from OSH due to ICH on CT head. Pt's last known normal was yesterday evening. Pt fell back and out of chair yesterday while out on a beach. Pt had no LOC, and no issues after. Went to sleep as normal, but about 2AM family reports that pt didn't seem like himself. Family tried waking him up at 7AM and reports he was unarousable. Pt was taken to [facility] where he received a CT head non-contrast which showed Grade 3 ICH with significant intraventricular blood and hydrocephalus. Pt was transferred to [facility]. In the ED, pt's GCS was 6T. EVD was placed and pt was found to have high opening pressure at about [id number]. This is a 71yo M from [facility] with PMHx of HTN and Parkinson's with b/l DBS who was transferred from OSH due to ICH on CT head. Pt's last known normal was yesterday evening. Pt fell back and out of chair yesterday while out on a beach. Pt had no LOC, and no issues after. Went to sleep as normal, but about 2AM family reports that pt didn't seem like himself. Family tried waking him up at 7AM and reports he was unarousable. Pt was taken to [facility] where he received a CT head non-contrast which showed Grade 3 ICH with significant intraventricular blood and hydrocephalus. Pt was transferred to [facility]. In the ED, pt's GCS was 7T. EVD was placed and pt was found to have high opening pressure at about [id number].

## 2017-09-01 NOTE — H&P ADULT - ASSESSMENT
Assessment: This is a 73yo M with PMHx of HTN and Parkinson's (b/l DBS) who was taken to OSH due to AMS, where CT head non-contrast showed Grade 3 ICH with significant intraventricular blood and hydrocephalus. Pt was transferred to [facility]. At presentation GCS was 6T. EVD was placed with opening pressure of [id number].     Plan:  Place right frontal EVD  Admit to NSCU  Q1hr neurochecks  Pain control  CT and CTA Assessment: This is a 73yo M with PMHx of HTN and Parkinson's (b/l DBS) who was taken to OSH due to AMS, where CT head non-contrast showed Grade 3 ICH with significant intraventricular blood and hydrocephalus. Pt was transferred to [facility]. At presentation GCS was 7T. EVD was placed with opening pressure of [id number].     Plan:  Place right frontal EVD  Admit to NSCU  Q1hr neurochecks  Pain control  CT and CTA

## 2017-09-01 NOTE — PROGRESS NOTE ADULT - ASSESSMENT
ASSESSMENT/PLAN: S/P Fall 8/31/17; L thalamic heme with IVH and small midbrain heme; Parkinsons Dz; Hydrocephalus --> EVD     NEURO: Neuro checks q 1 hr ; Repeat CT for Bleed stablity ; No surg indication at this point; Monitor ICP and CSF ouitput with EVD ; CTA - neg for Aneurysm or AVM, Place EEG R/O seizures if no change in clinical exam ; start Parkisons meds in am ; trial of precedex wean off propofol .   Activity:  [X] Bedrest   PULM: Resp failure- Check CXR and ABG ,  maintain O2 sats > 93 % ; continue mandatory ventilation     CV:  Maintain -< 160   Schedule Cardiac ECHO   Baseline EKG     RENAL:  King to monitor Urine output; check CPK for possible Rhabdo due to fall    Fluids: NS atb 70 cc/hr - keep total fluids at 70 cc/hr     GI:  Diet:  GI prophylaxis [] not indicated [X] PPI [] other:  Bowel regimen [] colace X[] senna    ENDO:  Check HGBA1C and Lipid profile   Goal euglycemia (-180)    HEME/ONC:  VTE prophylaxis: [X] SCDs [] chemoprophylaxis held due to acute bleed and risk of progression . Pt though is high risk of DVT    ID: Afebrile ; Check procalcitonin if fever and W/U       SOCIAL/FAMILY:  [X] updated at bedside [] family meeting    CODE STATUS:  [X] Full Code [] DNR [] DNI [] Palliative/Comfort Care    DISPOSITION:  [] ICU [] Stroke Unit [] Floor [] EMU [] RCU [] PCU    [] Patient is at high risk of neurologic deterioration/death due to:     Time seen:  Time spent: ___ [] critical care minutes

## 2017-09-01 NOTE — ED PROVIDER NOTE - OBJECTIVE STATEMENT
Pt seen and evaluated on arrival.  72M pmhx of parkinsons dementia tx from [facility] for ICH. Pt had fall yesterday but was otherwise well. Difficult to arouse this am and taken to [place], found to have ICH and tx to [facility] for further management. Initial GCS 7.

## 2017-09-01 NOTE — PROCEDURE NOTE - GENERAL PROCEDURE DETAILS
Placement of right frontal EVD. Side and site were marked. Patient was prepped in sterile fashion. CSF was obtained after a single pas of the evd. patient was under high pressure of 35

## 2017-09-01 NOTE — ED PROVIDER NOTE - PHYSICAL EXAMINATION
Obtunded, NCAT, Pupils symmetric, sluggish, Intubated. breath sounds symmetric, HR regular, w/draws to pain in upper ext bl, flexion in lowers.

## 2017-09-01 NOTE — PROCEDURE NOTE - NSSITEPREP_SKIN_A_CORE
chlorhexidine/Adherence to aseptic technique: hand hygiene prior to donning barriers (gown, gloves), don cap and mask, sterile drape over patient
chlorhexidine

## 2017-09-02 NOTE — PROGRESS NOTE ADULT - ATTENDING COMMENTS
Patient is at high risk of neurologic deterioration/death due to: herniation, rebleed  I spent 50 minuted managing ICH,EVD, and discussing goals of care with family

## 2017-09-02 NOTE — PROGRESS NOTE ADULT - SUBJECTIVE AND OBJECTIVE BOX
Anesthesia called for ETT evaluation.  ETT appears to have a leak.  100% O2 given.  50mg Propofol, 60mg succinylcholine given.   7.5 ETT passed without trauma with #3 Glidescope after removal of 7.0 ETT.  X1 Attempt, + ETCO2 via EasyCap, + BBS, taped at 24 cm, teeth intact, no complications.

## 2017-09-02 NOTE — PROGRESS NOTE ADULT - SUBJECTIVE AND OBJECTIVE BOX
Mr Magali's family wishes to make him comfortable. They state that he would not want to live like this, he doesn't want to live with moderate to severe disability, they wish that he get extubated and made comfortable. He is DNR and DNI and we will follow family's wishes.    Rashida Orellana [facility] attending

## 2017-09-02 NOTE — AIRWAY REMOVAL NOTE  ADULT & PEDS - ARTIFICAL AIRWAY REMOVAL COMMENTS
Written order for extubation verified. The patient was identified by full name and birth date compared to the identification band. Present during the procedure was Sara Haro RN

## 2017-09-02 NOTE — CONSULT NOTE ADULT - SUBJECTIVE AND OBJECTIVE BOX
Neurology Consult    Name  GABRIEL GOLDBERG    73yo M from [facility] with PMHx of HTN and Parkinson's with b/l DBS who was transferred from OSH due to ICH on CT head. Pt's last known normal was yesterday evening. Pt fell back and out of chair yesterday while out on a beach. Pt had no LOC, and no issues after. Went to sleep as normal, but about 2AM family reports that pt didn't seem like himself. Family tried waking him up at 7AM and reports he was unarousable. Pt was taken to [facility] where he received a CT head non-contrast which showed Grade 3 ICH with significant intraventricular blood and hydrocephalus. Pt was transferred to [facility]. In the ED, pt's GCS was 7T. EVD was placed and pt was found to have high opening pressure at about [id number].  Patient admitted to NSCU for management.  Neurology consulted for Parkinson's medication regimen.                                                          MEDICATIONS  (STANDING):  docusate sodium 100 milliGRAM(s) Oral three times a day  levETIRAcetam  IVPB 500 milliGRAM(s) IV Intermittent every 12 hours  chlorhexidine 0.12% Liquid 15 milliLiter(s) Swish and Spit two times a day  niCARdipine Infusion 5 mG/Hr (25 mL/Hr) IV Continuous <Continuous>  artificial  tears Solution 1 Drop(s) Both EYES every 4 hours  pantoprazole  Injectable 40 milliGRAM(s) IV Push daily  sodium chloride 0.9% with potassium chloride 20 mEq/L 1000 milliLiter(s) (75 mL/Hr) IV Continuous <Continuous>    MEDICATIONS  (PRN):  acetaminophen    Suspension 905 milliGRAM(s) Oral every 6 hours PRN For Temp greater than 38 C (100.4 F)  acetaminophen    Suspension. 650 milliGRAM(s) Oral every 6 hours PRN Mild Pain (1 - 3)  ondansetron Injectable 4 milliGRAM(s) IV Push every 6 hours PRN Nausea and/or Vomiting  senna 2 Tablet(s) Oral at bedtime PRN Constipation      Allergies    No Known Allergies    Intolerances        Objective  Vital Signs Last 24 Hrs  T(C): 37.1 (02 Sep 2017 11:00), Max: 39.5 (01 Sep 2017 19:00)  T(F): 98.8 (02 Sep 2017 11:00), Max: 103.1 (01 Sep 2017 19:00)  HR: 82 (02 Sep 2017 12:00) (64 - 178)  BP: 134/92 (02 Sep 2017 02:00) (103/77 - 173/101)  BP(mean): 101 (02 Sep 2017 02:00) (82 - 107)  RR: 18 (02 Sep 2017 12:00) (15 - 29)  SpO2: 100% (02 Sep 2017 12:00) (86% - 100%)    General Exam   General appearance: No acute distress, well-nourished  Respiratory:    non-labored respirations               Neurological Exam:  Pt with eye opening to noxious stimuli, intubated ; sedation propofol ; pupils 1 MM NR ; L eye 1 mm sluggish ; no regard to threat R eye yet regards to threat  L eye; dolls ; roving eye movements ; cough and gag present      Motor exam:          Upper extremity                         Delt     Bicep     Tricep    HG                                                 R         Ext posturing to noxious stimuli                                               L          Elevates distal LUE to noxious           Lower extremity                        HF         KF        KE       DF         PF                                                  R          Extensor and rigid to passive movement                                                L           Rigid to passive movement                                                  Sensation: No grimacing to noxious         Other Studies    09-01    145  |  107  |  19  ----------------------------<  149<H>  3.5   |  22  |  1.11    Ca    8.5      01 Sep 2017 21:33  Phos  3.3     09-01  Mg     1.8     09-01    TPro  7.3  /  Alb  3.7  /  TBili  0.7  /  DBili  x   /  AST  24  /  ALT  20  /  AlkPhos  69  09-01 09-01    145  |  107  |  19  ----------------------------<  149<H>  3.5   |  22  |  1.11    Ca    8.5      01 Sep 2017 21:33  Phos  3.3     09-01  Mg     1.8     09-01    TPro  7.3  /  Alb  3.7  /  TBili  0.7  /  DBili  x   /  AST  24  /  ALT  20  /  AlkPhos  69  09-01    LIVER FUNCTIONS - ( 01 Sep 2017 19:07 )  Alb: 3.7 g/dL / Pro: 7.3 g/dL / ALK PHOS: 69 U/L / ALT: 20 U/L RC / AST: 24 U/L / GGT: x             Radiology    CTH: Markedly limited study. No significant interval change identified when  compared to prior study on 9/1/2017. Large acute left thalamic infarct with  intraventricular extension. Subarachnoid hemorrhage. Neurology Consult    Name  GABRIEL GOLDBERG    73yo M from [facility] with PMHx of HTN and Parkinson's with b/l DBS who was transferred from OSH due to ICH on CT head. Pt's last known normal was yesterday evening. Pt fell back and out of chair yesterday while out on a beach. Pt had no LOC, and no issues after. Went to sleep as normal, but about 2AM family reports that pt didn't seem like himself. Family tried waking him up at 7AM and reports he was unarousable. Pt was taken to [facility] where he received a CT head non-contrast which showed Grade 3 ICH with significant intraventricular blood and hydrocephalus. Pt was transferred to [facility]. In the ED, pt's GCS was 7T. EVD was placed and pt was found to have high opening pressure at about [id number].  Patient admitted to NSCU for management.  Neurology consulted for Parkinson's medication regimen.                                                          MEDICATIONS  (STANDING):  docusate sodium 100 milliGRAM(s) Oral three times a day  levETIRAcetam  IVPB 500 milliGRAM(s) IV Intermittent every 12 hours  chlorhexidine 0.12% Liquid 15 milliLiter(s) Swish and Spit two times a day  niCARdipine Infusion 5 mG/Hr (25 mL/Hr) IV Continuous <Continuous>  artificial  tears Solution 1 Drop(s) Both EYES every 4 hours  pantoprazole  Injectable 40 milliGRAM(s) IV Push daily  sodium chloride 0.9% with potassium chloride 20 mEq/L 1000 milliLiter(s) (75 mL/Hr) IV Continuous <Continuous>    MEDICATIONS  (PRN):  acetaminophen    Suspension 905 milliGRAM(s) Oral every 6 hours PRN For Temp greater than 38 C (100.4 F)  acetaminophen    Suspension. 650 milliGRAM(s) Oral every 6 hours PRN Mild Pain (1 - 3)  ondansetron Injectable 4 milliGRAM(s) IV Push every 6 hours PRN Nausea and/or Vomiting  senna 2 Tablet(s) Oral at bedtime PRN Constipation      Allergies    No Known Allergies    Intolerances        Objective  Vital Signs Last 24 Hrs  T(C): 37.1 (02 Sep 2017 11:00), Max: 39.5 (01 Sep 2017 19:00)  T(F): 98.8 (02 Sep 2017 11:00), Max: 103.1 (01 Sep 2017 19:00)  HR: 82 (02 Sep 2017 12:00) (64 - 178)  BP: 134/92 (02 Sep 2017 02:00) (103/77 - 173/101)  BP(mean): 101 (02 Sep 2017 02:00) (82 - 107)  RR: 18 (02 Sep 2017 12:00) (15 - 29)  SpO2: 100% (02 Sep 2017 12:00) (86% - 100%)    General Exam   General appearance: Sedated intubated             Neurological Exam:  Pt with eye opening to noxious stimuli, intubated ; sedation propofol ; pupils 1 MM NR ; L eye 1 mm sluggish ; no blink to threat R eye, blink to threat  L eye; dolls ; roving eye movements ; cough and gag present      Motor exam:          Upper extremity                         Delt     Bicep     Tricep    HG                                                 R         Ext posturing to noxious stimuli                                               L          Elevates distal LUE to noxious           Lower extremity                        HF         KF        KE       DF         PF                                                  R          Extensor and rigid to passive movement                                                L           Rigid to passive movement                                                  Sensation: No grimacing to noxious         Other Studies    09-01    145  |  107  |  19  ----------------------------<  149<H>  3.5   |  22  |  1.11    Ca    8.5      01 Sep 2017 21:33  Phos  3.3     09-01  Mg     1.8     09-01    TPro  7.3  /  Alb  3.7  /  TBili  0.7  /  DBili  x   /  AST  24  /  ALT  20  /  AlkPhos  69  09-01 09-01    145  |  107  |  19  ----------------------------<  149<H>  3.5   |  22  |  1.11    Ca    8.5      01 Sep 2017 21:33  Phos  3.3     09-01  Mg     1.8     09-01    TPro  7.3  /  Alb  3.7  /  TBili  0.7  /  DBili  x   /  AST  24  /  ALT  20  /  AlkPhos  69  09-01    LIVER FUNCTIONS - ( 01 Sep 2017 19:07 )  Alb: 3.7 g/dL / Pro: 7.3 g/dL / ALK PHOS: 69 U/L / ALT: 20 U/L RC / AST: 24 U/L / GGT: x             Radiology    CTH: Markedly limited study. No significant interval change identified when  compared to prior study on 9/1/2017. Large acute left thalamic infarct with  intraventricular extension. Subarachnoid hemorrhage.

## 2017-09-02 NOTE — CONSULT NOTE ADULT - ASSESSMENT
73yo M with PMHx of HTN and Parkinson's (b/l DBS) who was taken to OSH due to AMS, where CT head non-contrast showed Grade 3 ICH with significant intraventricular blood and hydrocephalus. Pt was transferred to [facility]. At presentation GCS was 7T. EVD was placed with opening pressure of [id number]. Patient [facility]

## 2017-09-02 NOTE — PROGRESS NOTE ADULT - ASSESSMENT
72M PMH parkinsons s/p b/l DBP ADM 9/1 with L thalamic ICH s/p EVD.   - Plan to restart Parkinson's meds in AM  - CTH i nAM  -TTE-p  - Clemente 38.7, pan cultured

## 2017-09-02 NOTE — CHART NOTE - NSCHARTNOTEFT_GEN_A_CORE
Called by nurse and respiratory therapist because patient's ETT balloon has had to be inflated multiple times since overnight and noted to have a cuff leak. He is taking low tidal volumes (~200); tube is adequately placed on CXR this morning. Anesthesia called to evaluate for possible tube exchange.

## 2017-09-02 NOTE — PROGRESS NOTE ADULT - ASSESSMENT
ASSESSMENT/PLAN: S/P Fall 8/31/17; L thalamic heme with IVH and small midbrain heme; Parkinsons Dz; Hydrocephalus --> EVD     NEURO: Neuro checks q 1 hr ; Repeat CT for Bleed stablity ; No surg indication at this point; Monitor ICP and CSF ouitput with EVD ; CTA - neg for Aneurysm or AVM, Place EEG R/O seizures if no change in clinical exam ; start Parkisons meds in am ; trial of precedex wean off propofol .   Activity:  [X] Bedrest   PULM: Resp failure- Check CXR and ABG ,  maintain O2 sats > 93 % ; continue mandatory ventilation     CV:  Maintain -< 160   Schedule Cardiac ECHO   Baseline EKG     RENAL:  King to monitor Urine output; check CPK for possible Rhabdo due to fall    Fluids: NS atb 70 cc/hr - keep total fluids at 70 cc/hr     GI:  Diet:  GI prophylaxis [] not indicated [X] PPI [] other:  Bowel regimen [] colace X[] senna    ENDO:  Check HGBA1C and Lipid profile   Goal euglycemia (-180)    HEME/ONC:  VTE prophylaxis: [X] SCDs [] chemoprophylaxis held due to acute bleed and risk of progression . Pt though is high risk of DVT    ID: Afebrile ; Check procalcitonin if fever and W/U       SOCIAL/FAMILY:  [X] updated at bedside [] family meeting    CODE STATUS:  [X] Full Code [] DNR [] DNI [] Palliative/Comfort Care    DISPOSITION:  [] ICU [] Stroke Unit [] Floor [] EMU [] RCU [] PCU    [] Patient is at high risk of neurologic deterioration/death due to:     Time seen:  Time spent: ___ [] critical care minutes ASSESSMENT/PLAN: S/P Fall 8/31/17; L thalamic heme with IVH and small midbrain heme; Parkinsons Dz; Hydrocephalus --> EVD     NEURO: Neuro checks q 1 hr ; CT head stable; No surg indication at this point; Monitor ICP and CSF ouitput with EVD ;Keep ICP< 20, CPP>60 mmhg   CTA - neg for Aneurysm or AVM,start Parkisons meds today  Activity:  [X] Bedrest   PULM: Resp failure- ET tube exchanged today because of cuff leak,  maintain O2 sats > 93 % ; continue mandatory ventilation, decreased rate from 18 to 14, abd TV at 7 ml/kg    CV:  Maintain -< 160, CPP> 60 mmhg, labetolol 100 mg orally Q8 hours    Cardiac ECHO pending  Baseline EKG     RENAL:  King to monitor Urine output   Fluids: CHANGE FLUID FROM ns TO 2 % AT 50 ML/HR WITH bmp q6 HOURS    GI:  Diet: Start TF  ISS  GI prophylaxis [] not indicated [X] PPI [] other:  Bowel regimen [] colace X[] senna    ENDO:  Check HGBA1C and Lipid profile   Goal euglycemia (-180)    HEME/ONC:  VTE prophylaxis: [X] SCDs [] chemoprophylaxis held due to acute bleed and risk of progression . Pt though is high risk of DVT    ID: Afebrile ; If fever, will start antibiotics, cx pending      SOCIAL/FAMILY:  [X] updated at bedside [] family meeting    CODE STATUS:  [X] Full Code [] DNR [] DNI [] Palliative/Comfort Care    DISPOSITION:  [] ICU [] Stroke Unit [] Floor [] EMU [] RCU [] PCU    [] Patient is at high risk of neurologic deterioration/death due to:     Time seen:  Time spent: ___ [] critical care minutes ASSESSMENT/PLAN: S/P Fall 8/31/17; L thalamic heme with IVH and small midbrain heme; Parkinsons Dz; Hydrocephalus --> EVD     NEURO: Neuro checks q 1 hr ; CT head stable; No surg indication at this point; Monitor ICP and CSF ouitput with EVD ;Keep ICP< 20, CPP>60 mmhg   CTA - neg for Aneurysm or AVM,start Parkisons meds today  Activity:  [X] Bedrest   PULM: Resp failure- ET tube exchanged today because of cuff leak,  maintain O2 sats > 93 % ; continue mandatory ventilation, decreased rate from 18 to 14, abd TV at 7 ml/kg    CV:  Maintain -< 160, CPP> 60 mmhg, labetolol 100 mg orally Q8 hours    Cardiac ECHO pending  Baseline EKG     RENAL:  King to monitor Urine output   Fluids: CHANGE FLUID FROM ns TO 2 % AT 50 ML/HR WITH bmp q6 HOURS    GI:  Diet: Start TF  ISS  GI prophylaxis [] not indicated [X] PPI [] other:  Bowel regimen [] colace X[] senna    ENDO:  Check HGBA1C and Lipid profile   Goal euglycemia (-180)    HEME/ONC:  VTE prophylaxis: [X] SCDs [] chemoprophylaxis held due to acute bleed and risk of progression . Pt though is high risk of DVT    ID: Afebrile ; If fever, will start antibiotics, cx pending      SOCIAL/FAMILY:  [X] updated at bedside [] family meeting  I discussed extensively with family the prognosis, I showed them the images, daughter and wife state that Jurgen told them that he would not want to live if he is going to be severely disabled. Will give family time to cope with what happened. Will meet with them again this afternoon and discuss goals of care. They are waiting for the rest of the family to come before deciding on the goals of care.     CODE STATUS:  [X] Full Code [] DNR [] DNI [] Palliative/Comfort Care    DISPOSITION:  [] ICU [] Stroke Unit [] Floor [] EMU [] RCU [] PCU    [] Patient is at high risk of neurologic deterioration/death due to:     Time seen:  Time spent: ___ [] critical care minutes

## 2017-09-02 NOTE — PROGRESS NOTE ADULT - SUBJECTIVE AND OBJECTIVE BOX
Patient Seen and Examined.     Overnight Events: None    T(C): 37.5 (09-02-17 @ 01:00), Max: 39.5 (09-01-17 @ 19:00)  HR: 66 (09-02-17 @ 01:08) (64 - 178)  BP: 137/82 (09-02-17 @ 01:00) (103/77 - 173/101)  RR: 23 (09-02-17 @ 01:00) (18 - 29)  SpO2: 99% (09-02-17 @ 01:08) (91% - 100%)    Exam:   Intubated, EO to voice, not FC  pupils fixed  RUE extensor, LUE localizes  RLE and LLE TF    EVD@10    propofol Infusion 5 MICROgram(s)/kG/Min IV Continuous <Continuous>  acetaminophen    Suspension 905 milliGRAM(s) Oral every 6 hours PRN  acetaminophen    Suspension. 650 milliGRAM(s) Oral every 6 hours PRN  ondansetron Injectable 4 milliGRAM(s) IV Push every 6 hours PRN  docusate sodium 100 milliGRAM(s) Oral three times a day  senna 2 Tablet(s) Oral at bedtime PRN  levETIRAcetam  IVPB 500 milliGRAM(s) IV Intermittent every 12 hours  chlorhexidine 0.12% Liquid 15 milliLiter(s) Swish and Spit two times a day  niCARdipine Infusion 5 mG/Hr IV Continuous <Continuous>  artificial  tears Solution 1 Drop(s) Both EYES every 4 hours  pantoprazole  Injectable 40 milliGRAM(s) IV Push daily  sodium chloride 0.9% with potassium chloride 20 mEq/L 1000 milliLiter(s) IV Continuous <Continuous>                            16.6   16.2  )-----------( 170      ( 01 Sep 2017 19:07 )             50.8     09-01    145  |  107  |  19  ----------------------------<  149<H>  3.5   |  22  |  1.11    Ca    8.5      01 Sep 2017 21:33  Phos  3.3     09-01  Mg     1.8     09-01    TPro  7.3  /  Alb  3.7  /  TBili  0.7  /  DBili  x   /  AST  24  /  ALT  20  /  AlkPhos  69  09-01    PT/INR - ( 01 Sep 2017 22:48 )   PT: 12.6 sec;   INR: 1.16 ratio         PTT - ( 01 Sep 2017 22:48 )  PTT:27.0 sec    Imaging: CTH: L thalamic hemorrhage with IVH and hydro

## 2017-09-02 NOTE — CONSULT NOTE ADULT - PROBLEM SELECTOR RECOMMENDATION 9
Please get medication reconciliation for patient's home dosage.  Start Sinemet 25/250, 5 times daily until, home dose is clarified. Please get medication reconciliation for patient's home dosage.  Start Sinemet 25/250, 1/4 tab 5 times daily until, home dose is clarified.

## 2017-09-02 NOTE — PROGRESS NOTE ADULT - ATTENDING COMMENTS
I saw and evaluated the patient. I reviewed the resident's note and agree. 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 The patient is intubated, not opening eyes, localizing on the left, and extensor posturing on the right, with a GCS of 7T ([id number]). Continue external ventricular drainage and strict blood pressure control. Continue to discuss with family goals of care. Appreciate NSCU support.

## 2017-09-02 NOTE — PROGRESS NOTE ADULT - SUBJECTIVE AND OBJECTIVE BOX
SUMMARY:HPI:  This is a 73yo M from [facility] with PMHx of HTN and Parkinson's with b/l DBS who was transferred from OSH due to ICH on CT head. Pt's last known normal was yesterday evening. Pt fell back and out of chair yesterday while out on a beach. Pt had no LOC, and no issues after. Went to sleep as normal, but about 2AM family reports that pt didn't seem like himself. Family tried waking him up at 7AM and reports he was unarousable. Pt was taken to [facility] where he received a CT head non-contrast which showed Grade 3 ICH with significant intraventricular blood and hydrocephalus. Pt was transferred to [facility]. In the ED, pt's GCS was 6T. EVD was placed and pt was found to have high opening pressure at about [id number]     ADMISSION SCORES:   GCS: 8 T    ICH score 3:    Overnight Events: No fevers.    ROS: negative [] unable to obtain as patient is comatose/intubated/aphasic [x]   VITALS:   T(C): 36.7 (09-02-17 @ 05:00), Max: 39.5 (09-01-17 @ 19:00)  HR: 73 (09-02-17 @ 06:00) (64 - 178)  BP: 134/92 (09-02-17 @ 02:00) (103/77 - 173/101)  RR: 22 (09-02-17 @ 06:00) (15 - 29)  SpO2: 92% (09-02-17 @ 06:00) (89% - 100%)    09-01-17 @ 07:01  -  09-02-17 @ 07:00  --------------------------------------------------------  IN: 942.7 mL / OUT: 913 mL / NET: 29.7 mL    LABS:  ABG - ( 01 Sep 2017 21:26 )  pH: 7.41  /  pCO2: 38    /  pO2: 110   / HCO3: 23    / Base Excess: -.4   /  SaO2: 98                   16.6   16.2  )-----------( 170      ( 01 Sep 2017 19:07 )             50.8     145  |  107  |  19  ----------------------------<  149<H>  3.5   |  22  |  1.11    Ca    8.5      01 Sep 2017 21:33  Phos  3.3     09-01  Mg     1.8     09-01    TPro  7.3  /  Alb  3.7  /  TBili  0.7  /  DBili  x   /  AST  24  /  ALT  20  /  AlkPhos  69  09-01    PT/INR - ( 01 Sep 2017 22:48 )   PT: 12.6 sec;   INR: 1.16 ratio         PTT - ( 01 Sep 2017 22:48 )  PTT:27.0 sec  MEDS:  MEDICATIONS  (STANDING):  propofol Infusion 5 MICROgram(s)/kG/Min (1.809 mL/Hr) IV Continuous <Continuous>  docusate sodium 100 milliGRAM(s) Oral three times a day  levETIRAcetam  IVPB 500 milliGRAM(s) IV Intermittent every 12 hours  chlorhexidine 0.12% Liquid 15 milliLiter(s) Swish and Spit two times a day  niCARdipine Infusion 5 mG/Hr (25 mL/Hr) IV Continuous <Continuous>  artificial  tears Solution 1 Drop(s) Both EYES every 4 hours  pantoprazole  Injectable 40 milliGRAM(s) IV Push daily  sodium chloride 0.9% with potassium chloride 20 mEq/L 1000 milliLiter(s) (75 mL/Hr) IV Continuous <Continuous>    [All pertinent recent Imaging/Reports reviewed]    DEVICES: [x] Restraints [] JENELLE/HMV []LD [x] ET tube [] Trach [x] A-line [] King [x] NGT [] Rectal Tube   Mode: AC/ CMV (Assist Control/ Continuous Mandatory Ventilation)  RR (machine): 18  TV (machine): 500  FiO2: 60  PEEP: 5  ITime: 1  MAP: 6  PIP: 8      PHYSICAL EXAM:    Constitutional: Sedated     Neurological:Pt with eye opening to noxious stimuli, intuubated ; sedation propofol ; pupils 1 MM NR ; L eye 1 mm sluggish ; no regard to threat R eye yet regards to threat  L eye; dolls ; roving eye movements ; cougha nd gag prseent      Motor exam:          Upper extremity                         Delt     Bicep     Tricep    HG                                                 R         Ext posturing to noxious stimuli                                               L          Elevates distal LUE to noxious           Lower extremity                        HF         KF        KE       DF         PF                                                  R          Extensor and rigid to passive movemnet                                                L           Rigid to passive movement                                                  Sensation: No grimacing to noxious      Pulmonary: Cracles at bases      Cardiovascular: S1, S2, Regular rate and rhythm     Gastrointestinal: Soft, Non-tender, Non-distended       EXT - DP - 2+ B/L SUMMARY:HPI:  This is a 73yo M from [facility] with PMHx of HTN and Parkinson's with b/l DBS who was transferred from OSH due to ICH on CT head. Pt's last known normal was yesterday evening. Pt fell back and out of chair yesterday while out on a beach. Pt had no LOC, and no issues after. Went to sleep as normal, but about 2AM family reports that pt didn't seem like himself. Family tried waking him up at 7AM and reports he was unarousable. Pt was taken to [facility] where he received a CT head non-contrast which showed Grade 3 ICH with significant intraventricular blood and hydrocephalus. Pt was transferred to [facility]. In the ED, pt's GCS was 6T. EVD was placed and pt was found to have high opening pressure at about [id number]     ADMISSION SCORES:   GCS: 8 T    ICH score 3:    9/2/17: EVD AT 10 mmhg, had cuff leak, ET tube exchanged       ROS: negative [] unable to obtain as patient is comatose/intubated/aphasic [x]   VITALS:   T(C): 36.7 (09-02-17 @ 05:00), Max: 39.5 (09-01-17 @ 19:00)  HR: 73 (09-02-17 @ 06:00) (64 - 178)  BP: 134/92 (09-02-17 @ 02:00) (103/77 - 173/101)  RR: 22 (09-02-17 @ 06:00) (15 - 29)  SpO2: 92% (09-02-17 @ 06:00) (89% - 100%)    09-01-17 @ 07:01  -  09-02-17 @ 07:00  --------------------------------------------------------  IN: 942.7 mL / OUT: 913 mL / NET: 29.7 mL    LABS:  ABG - ( 01 Sep 2017 21:26 )  pH: 7.41  /  pCO2: 38    /  pO2: 110   / HCO3: 23    / Base Excess: -.4   /  SaO2: 98                   16.6   16.2  )-----------( 170      ( 01 Sep 2017 19:07 )             50.8     145  |  107  |  19  ----------------------------<  149<H>  3.5   |  22  |  1.11    Ca    8.5      01 Sep 2017 21:33  Phos  3.3     09-01  Mg     1.8     09-01    TPro  7.3  /  Alb  3.7  /  TBili  0.7  /  DBili  x   /  AST  24  /  ALT  20  /  AlkPhos  69  09-01    PT/INR - ( 01 Sep 2017 22:48 )   PT: 12.6 sec;   INR: 1.16 ratio         PTT - ( 01 Sep 2017 22:48 )  PTT:27.0 sec  MEDS:  MEDICATIONS  (STANDING):  propofol Infusion 5 MICROgram(s)/kG/Min (1.809 mL/Hr) IV Continuous <Continuous>  docusate sodium 100 milliGRAM(s) Oral three times a day  levETIRAcetam  IVPB 500 milliGRAM(s) IV Intermittent every 12 hours  chlorhexidine 0.12% Liquid 15 milliLiter(s) Swish and Spit two times a day  niCARdipine Infusion 5 mG/Hr (25 mL/Hr) IV Continuous <Continuous>  artificial  tears Solution 1 Drop(s) Both EYES every 4 hours  pantoprazole  Injectable 40 milliGRAM(s) IV Push daily  sodium chloride 0.9% with potassium chloride 20 mEq/L 1000 milliLiter(s) (75 mL/Hr) IV Continuous <Continuous>    [All pertinent recent Imaging/Reports reviewed]    DEVICES: [x] Restraints [] JENELLE/HMV []LD [x] ET tube [] Trach [x] A-line [] King [x] NGT [] Rectal Tube   Mode: AC/ CMV (Assist Control/ Continuous Mandatory Ventilation)  RR (machine): 14  TV (machine): 450  FiO2: 40  PEEP: 5  ITime: 1  MAP: 6  PIP: 8      PHYSICAL EXAM:    Constitutional: Sedated     Neurological: Pt with eye opening to noxious stimuli, intubated ; no sedation; pupils 1 MM NR ; L eye 2 mm sluggish R eye 1 mm sluggish; no regard to threat R eye yet regards to threat  L eye; dolls ; roving eye movements ; cough and gag present     Motor exam:          Upper extremity                         Delt     Bicep     Tricep    HG                                                 R         Ext posturing to noxious stimuli                                               L          moves LUE spontaneously          Lower extremity                        HF         KF        KE       DF         PF                                                  R          Extensor and rigid to passive movemnet                                                L           Rigid to passive movement                                                  Sensation: No grimacing to noxious      Pulmonary: Crackles at bases      Cardiovascular: S1, S2, Regular rate and rhythm     Gastrointestinal: Soft, Non-tender, Non-distended       EXT - DP - 2+ B/L SUMMARY:HPI:  This is a 73yo M from [facility] with PMHx of HTN and Parkinson's with b/l DBS who was transferred from OSH due to ICH on CT head. Pt's last known normal was yesterday evening. Pt fell back and out of chair yesterday while out on a beach. Pt had no LOC, and no issues after. Went to sleep as normal, but about 2AM family reports that pt didn't seem like himself. Family tried waking him up at 7AM and reports he was unarousable. Pt was taken to [facility] where he received a CT head non-contrast which showed Grade 3 ICH with significant intraventricular blood and hydrocephalus. Pt was transferred to [facility]. In the ED, pt's GCS was 6T. EVD was placed and pt was found to have high opening pressure at about [id number]     ADMISSION SCORES:   GCS: 8 T    ICH score 3:    9/2/17: EVD AT 10 mmhg, had cuff leak, ET tube exchanged       ROS: negative [] unable to obtain as patient is comatose/intubated/aphasic [x]   VITALS:   T(C): 36.7 (09-02-17 @ 05:00), Max: 39.5 (09-01-17 @ 19:00)  HR: 73 (09-02-17 @ 06:00) (64 - 178)  BP: 134/92 (09-02-17 @ 02:00) (103/77 - 173/101)  RR: 22 (09-02-17 @ 06:00) (15 - 29)  SpO2: 92% (09-02-17 @ 06:00) (89% - 100%)    09-01-17 @ 07:01  -  09-02-17 @ 07:00  --------------------------------------------------------  IN: 942.7 mL / OUT: 913 mL / NET: 29.7 mL    LABS:  ABG - ( 01 Sep 2017 21:26 )  pH: 7.41  /  pCO2: 38    /  pO2: 110   / HCO3: 23    / Base Excess: -.4   /  SaO2: 98                   16.6   16.2  )-----------( 170      ( 01 Sep 2017 19:07 )             50.8     145  |  107  |  19  ----------------------------<  149<H>  3.5   |  22  |  1.11    Ca    8.5      01 Sep 2017 21:33  Phos  3.3     09-01  Mg     1.8     09-01    TPro  7.3  /  Alb  3.7  /  TBili  0.7  /  DBili  x   /  AST  24  /  ALT  20  /  AlkPhos  69  09-01    PT/INR - ( 01 Sep 2017 22:48 )   PT: 12.6 sec;   INR: 1.16 ratio         PTT - ( 01 Sep 2017 22:48 )  PTT:27.0 sec  MEDS:  MEDICATIONS  (STANDING):  propofol Infusion 5 MICROgram(s)/kG/Min (1.809 mL/Hr) IV Continuous <Continuous>  docusate sodium 100 milliGRAM(s) Oral three times a day  levETIRAcetam  IVPB 500 milliGRAM(s) IV Intermittent every 12 hours  chlorhexidine 0.12% Liquid 15 milliLiter(s) Swish and Spit two times a day  niCARdipine Infusion 5 mG/Hr (25 mL/Hr) IV Continuous <Continuous>  artificial  tears Solution 1 Drop(s) Both EYES every 4 hours  pantoprazole  Injectable 40 milliGRAM(s) IV Push daily  sodium chloride 0.9% with potassium chloride 20 mEq/L 1000 milliLiter(s) (75 mL/Hr) IV Continuous <Continuous>    [All pertinent recent Imaging/Reports reviewed]    DEVICES: [x] Restraints [] JENELLE/HMV []LD [x] ET tube [] Trach [x] A-line [] King [x] NGT [] Rectal Tube   Mode: AC/ CMV (Assist Control/ Continuous Mandatory Ventilation)  RR (machine): 14  TV (machine): 450  FiO2: 40  PEEP: 5  ITime: 1  MAP: 6  PIP: 8      PHYSICAL EXAM:    Constitutional: on no Sedated     Neurological:  intubated ; no sedation; Does not respond to commands. pupils  L eye 2 mm sluggish R eye 1.5 mm sluggish; no regard to threat R eye yet regards to threat  L eye; dolls ; roving eye movements ; cough and gag present     Motor exam:          Upper extremity                         Delt     Bicep     Tricep    HG                                                 R         Ext posturing to noxious stimuli                                               L          moves LUE spontaneously          Lower extremity                        HF         KF        KE       DF         PF                                                  R          Extensor and rigid to passive movemnet                                                L           Rigid to passive movement                                                  Sensation: No grimacing to noxious      Pulmonary: Crackles at bases      Cardiovascular: S1, S2, Regular rate and rhythm     Gastrointestinal: Soft, Non-tender, Non-distended       EXT - DP - 2+ B/L

## 2017-09-03 NOTE — CONSULT NOTE ADULT - ASSESSMENT
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 As per family and primary team notes, pt is visiting family from [facility] where he lives primarily. Pt was at the beach with family when he fell backwards out of his chair with change in mental status. Pt's mental status continued to worsen, so pt was taken to [facility] where left thalamic ICH was found; pt transferred to [facility] for neurosurg eval. Pt is s/p EVD, however pt did not have improvement in neuro status. Pt was downgraded from Neurosurg ICU today. Palliative called for end of life care and sxs management.

## 2017-09-03 NOTE — CONSULT NOTE ADULT - SUBJECTIVE AND OBJECTIVE BOX
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 As per family and primary team notes, pt is visiting family from [facility] where he lives primarily. Pt was at the beach with family when he fell backwards out of his chair with change in mental status. Pt's mental status continued to worsen, so pt was taken to [facility] where left thalamic ICH was found; pt transferred to [facility] for neurosurg eval. Pt is s/p EVD, however pt did not have improvement in neuro status. Pt was downgraded from Neurosurg ICU today. Palliative called for end of life care and sxs management.           PERTINENT PMH REVIEWED:  [ x] YES [ ] NO           SOCIAL HISTORY:   Significant other/partner:  [ ] YES  [x ] NO               Children:  [ x] YES- two daughters, one lives in , one in [facility]  [ ] NO                   Temple/Spirituality:  Substance hx:  [ ] YES   [x ] NO                   Tobacco hx:  [ ] YES  [x ] NO                       Alcohol hx: [ ] YES  [x ] NO         Home Opioid hx:  [ ] YES  [x ] NO   Living Situation: [x ] Home  [ ] Long term care  [ ] Rehab [ ] Other    FAMILY HISTORY:  No pertinent family history in first degree relatives    [ ] Family history non-contributory     BASELINE (I)ADLs (prior to admission):  [place]: [ ] total  [ ] moderate [ ] dependent    ADVANCE DIRECTIVES:    DNR [x ] YES [ ] NO                            [x ] Completed  MOLST  [ ] YES [x ] NO                      [ ] Completed  Health Care Proxy [ ] YES  [x] NO   [ ] Completed  Living Will  [ ] YES [x] NO             [x ] Surrogate  [ ] HCP  [ ] Guardian:     Daughter Lisseth                                                     Phone#:    Allergies    No Known Allergies    Intolerances        MEDICATIONS  (STANDING):  artificial  tears Solution 1 Drop(s) Both EYES three times a day  glycopyrrolate Injectable 0.4 milliGRAM(s) IV Push every 6 hours  morphine  Infusion 3 mG/Hr (3 mL/Hr) IV Continuous <Continuous>    MEDICATIONS  (PRN):  acetaminophen  Suppository 650 milliGRAM(s) Rectal every 6 hours PRN For Temp greater than 38 C (100.4 F)  morphine  - Injectable 6 milliGRAM(s) IV Push every 1 hour PRN pain  morphine  - Injectable 6 milliGRAM(s) IV Push every 1 hour PRN dyspnea  LORazepam   Injectable 0.5 milliGRAM(s) IV Push every 6 hours PRN Agitation      PRESENT SYMPTOMS:  Source: [x ] Patient   [x ] Family   [ ] Team     Pain:                        [x ] No [ ] Yes             [ ] Mild [ ] Moderate [ ] Severe    Onset -  Location -  Duration -  Character -  Alleviating/Aggravating -  Radiation -  Timing -      Dyspnea:                [ ] No [x ] Yes             [ ] Mild [ x] Moderate [ ] Severe    Anxiety:                  [x ] No [ ] Yes             [ ] Mild [ ] Moderate [ ] Severe    Fatigue:                  [ ] No [x ] Yes             [ ] Mild [x ] Moderate [ ] Severe    Nausea:                  [x ] No [ ] Yes             [ ] Mild [ ] Moderate [ ] Severe    Loss of appetite:   [ ] No [x ] Yes             [ ] Mild [x ] Moderate [ ] Severe    Constipation:        [x ] No [ ] Yes             [ ] Mild [ ] Moderate [ ] Severe    Other Symptoms:  [ ] All other review of systems negative   [x ] Unable to obtain due to poor mentation     Karnofsky Performance Score/Palliative Performance Status Version 2:    10     %    PHYSICAL EXAM:  Vital Signs Last 24 Hrs  T(C): 38.3 (03 Sep 2017 11:00), Max: 38.3 (03 Sep 2017 11:00)  T(F): 100.9 (03 Sep 2017 11:00), Max: 100.9 (03 Sep 2017 11:00)  HR: 96 (03 Sep 2017 11:00) (74 - 96)  BP: 122/73 (03 Sep 2017 11:00) (122/73 - 122/73)  BP(mean): --  RR: 20 (03 Sep 2017 11:00) (15 - 23)  SpO2: 78% (03 Sep 2017 11:00) (78% - 100%) I&O's Summary    02 Sep 2017 07:01  -  03 Sep 2017 07:00  --------------------------------------------------------  IN: 841 mL / OUT: 524 mL / NET: 317 mL    03 Sep 2017 07:01  -  03 Sep 2017 12:07  --------------------------------------------------------  IN: 3 mL / OUT: 0 mL / NET: 3 mL        General:  [ ] Alert  [ ] Oriented x      [x ] Lethargic  [ ] Agitated   [ ] Cachexia   [ ] Unarousable  [ ] Verbal  [x ] Non-Verbal    HEENT:  [x ] Normal   [ ] Dry mouth   [ ] ET Tube    [ ] Trach  [ ] Oral lesions    Lungs:   [ ] Clear [ x] Tachypnea  [x ] Audible excessive secretions   [x ] Rhonchi        [ ] Right [ ] Left [x ] Bilateral  [ ] Crackles        [ ] Right [ ] Left [ ] Bilateral  [ ] Wheezing     [ ] Right [ ] Left [ ] Bilateral    Cardiovascular:  [x ] Regular [ ] Irregular [ ] Tachycardia   [ ] Bradycardia  [ ] Murmur [ ] Other    Abdomen: [x ] Soft  [ ] Distended   [ ] +BS  [ ] Non tender [ ] Tender  [ ]PEG   [ ]OGT/ NGT   Last BM:       Genitourinary: [ x] Normal [ ] Incontinent   [ ] Oliguria/Anuria   [ ] King    Musculoskeletal:  [ ] Normal   [x ] Weakness  [x ] Bedbound/Wheelchair bound [ ] Edema    Neurological: [ ] No focal deficits  [ x] Cognitive impairment  [ ] Dysphagia [ ] Dysarthria [ ] Paresis [ ] Other     Skin: [x ] Normal   [ ] Pressure ulcer(s)                  [ ] Rash    LABS:                        16.6   16.2  )-----------( 170      ( 01 Sep 2017 19:07 )             50.8     09-01    145  |  107  |  19  ----------------------------<  149<H>  3.5   |  22  |  1.11    Ca    8.5      01 Sep 2017 21:33  Phos  3.3     09-01  Mg     1.8     09-01    TPro  7.3  /  Alb  3.7  /  TBili  0.7  /  DBili  x   /  AST  24  /  ALT  20  /  AlkPhos  69  09-01    PT/INR - ( 01 Sep 2017 22:48 )   PT: 12.6 sec;   INR: 1.16 ratio         PTT - ( 01 Sep 2017 22:48 )  PTT:27.0 sec  Urinalysis Basic - ( 01 Sep 2017 18:18 )    Color: x / Appearance: Clear / SG: >1.030 / pH: x  Gluc: x / Ketone: Negative  / Bili: Negative / Urobili: Negative   Blood: x / Protein: 30 mg/dL / Nitrite: Negative   Leuk Esterase: Negative / RBC: 5-10 /HPF / WBC x   Sq Epi: x / Non Sq Epi: x / Bacteria: x        Shock: [ ] Septic [ ] Cardiogenic [x ] Neurologic [ ] Hypovolemic  Vasopressors x   Inotropes x     Protein Calorie Malnutrition: [ ] Mild [ ] Moderate [ ] Severe    Oral Intake: [x ] Unable/mouth care only [ ] Minimal [ ] Moderate [ ] Full Capability  Diet: [x ] NPO [ ] Tube feeds [ ] TPN [ ] Other     RADIOLOGY & ADDITIONAL STUDIES:    REFERRALS:   [ ] Chaplaincy  [ ] Hospice  [ ] Child Life  [x ] Social Work  [ ] Case management [ ] Holistic Therapy

## 2017-09-03 NOTE — PROGRESS NOTE ADULT - ASSESSMENT
ASSESSMENT/PLAN: S/P Fall 8/31/17; L thalamic heme with IVH and small midbrain heme; Parkinsons Dz; Hydrocephalus --> EVD     NEURO: Neuro checks q 1 hr ; CT head stable; No surg indication at this point; Monitor ICP and CSF ouitput with EVD ;Keep ICP< 20, CPP>60 mmhg   CTA - neg for Aneurysm or AVM,start Parkisons meds today  Activity:  [X] Bedrest   PULM: Resp failure- ET tube exchanged today because of cuff leak,  maintain O2 sats > 93 % ; continue mandatory ventilation, decreased rate from 18 to 14, abd TV at 7 ml/kg    CV:  Maintain -< 160, CPP> 60 mmhg, labetolol 100 mg orally Q8 hours    Cardiac ECHO pending  Baseline EKG     RENAL:  King to monitor Urine output   Fluids: CHANGE FLUID FROM ns TO 2 % AT 50 ML/HR WITH bmp q6 HOURS    GI:  Diet: Start TF  ISS  GI prophylaxis [] not indicated [X] PPI [] other:  Bowel regimen [] colace X[] senna    ENDO:  Check HGBA1C and Lipid profile   Goal euglycemia (-180)    HEME/ONC:  VTE prophylaxis: [X] SCDs [] chemoprophylaxis held due to acute bleed and risk of progression . Pt though is high risk of DVT    ID: Afebrile ; If fever, will start antibiotics, cx pending      SOCIAL/FAMILY:  [X] updated at bedside [] family meeting  I discussed extensively with family the prognosis, I showed them the images, daughter and wife state that Jurgen told them that he would not want to live if he is going to be severely disabled. Will give family time to cope with what happened. Will meet with them again this afternoon and discuss goals of care. They are waiting for the rest of the family to come before deciding on the goals of care.     CODE STATUS:  [X] Full Code [] DNR [] DNI [] Palliative/Comfort Care    DISPOSITION:  [] ICU [] Stroke Unit [] Floor [] EMU [] RCU [] PCU    [] Patient is at high risk of neurologic deterioration/death due to:     Time seen:  Time spent: ___ [] critical care minutes ASSESSMENT/PLAN: S/P Fall 8/31/17; L thalamic heme with IVH and small midbrain heme; Parkinsons Dz; Hydrocephalus --> EVD     NEURO:   EVD removed today  Comfort measures only after discussion with the family 9/2 - on Morphine drip now  Palliative involvement  Activity:  [X] Bedrest     PULM: Extubated  O2 PRN for comfort  Glycopyrrolate for secretions    CV:  no telemetry required.    RENAL:   King d/c'ed.    GI:  NPO.  PPIs not required.    ENDO:  n/a    HEME/ONC:  no ppx required    ID: Afebrile     SOCIAL/FAMILY:  [X] updated at bedside [] family meeting  Palliative involvement.    CODE STATUS:  [X] Full Code [] DNR [] DNI [] Palliative/Comfort Care    DISPOSITION:  [] ICU [] Stroke Unit [x] Floor transfer [] EMU [] RCU [] PCU      Time spent: 35 critical care minutes ASSESSMENT/PLAN: S/P Fall 8/31/17; L thalamic heme with IVH and small midbrain heme; Parkinsons Dz; Hydrocephalus --> EVD     NEURO:   EVD removed today  Comfort measures only after discussion with the family 9/2 - on Morphine drip now  Palliative involvement  Activity:  [X] Bedrest     PULM: Extubated  O2 PRN for comfort  Glycopyrrolate for secretions    CV:  no telemetry required.    RENAL:   King d/c'ed.    GI:  NPO.  PPIs not required.    ENDO:  n/a    HEME/ONC:  no ppx required    ID: Afebrile     SOCIAL/FAMILY:  [X] updated at bedside [] family meeting  Palliative involvement.    CODE STATUS:  [] Full Code [x] DNR [x] DNI [x] Palliative/Comfort Care    DISPOSITION:  [] ICU [] Stroke Unit [x] Floor transfer [] EMU [] RCU [] PCU      Time spent: 35  minutes

## 2017-09-03 NOTE — CONSULT NOTE ADULT - PROBLEM SELECTOR RECOMMENDATION 5
Met with pt's two daughters at bedside. Confirmed DNR/DNI. Family understands pt is dying with prognosis of hours to days. Family agreeing to transfer to PCU at this time. Family wants to focus on pt's comfort.

## 2017-09-03 NOTE — PROGRESS NOTE ADULT - SUBJECTIVE AND OBJECTIVE BOX
SUMMARY:HPI:  This is a 71yo M from [facility] with PMHx of HTN and Parkinson's with b/l DBS who was transferred from OSH due to ICH on CT head. Pt's last known normal was yesterday evening. Pt fell back and out of chair yesterday while out on a beach. Pt had no LOC, and no issues after. Went to sleep as normal, but about 2AM family reports that pt didn't seem like himself. Family tried waking him up at 7AM and reports he was unarousable. Pt was taken to [facility] where he received a CT head non-contrast which showed Grade 3 ICH with significant intraventricular blood and hydrocephalus. Pt was transferred to [facility]. In the ED, pt's GCS was 6T. EVD was placed and pt was found to have high opening pressure at about 70ldH3B   9/2 EVD AT 10 mmhg, had cuff leak, ET tube exchanged. After discussion with the family, DNR and DNI status of the patient documented, comfort care measures only. Extubated, maintains his airways.    ADMISSION SCORES:   GCS: 8 T    ICH score 3:    Overnight Events:     ROS: negative [] unable to obtain as patient is comatose/intubated/aphasic []   VITALS:   T(C): 36.8 (09-02-17 @ 19:00), Max: 37.1 (09-02-17 @ 11:00)  HR: 93 (09-03-17 @ 01:30) (66 - 93)  BP: --  RR: 22 (09-03-17 @ 01:30) (15 - 23)  SpO2: 97% (09-03-17 @ 01:30) (86% - 100%)    09-02-17 @ 07:01  -  09-03-17 @ 07:00  --------------------------------------------------------  IN: 841 mL / OUT: 524 mL / NET: 317 mL      LABS:  ABG - ( 02 Sep 2017 15:49 )  pH: 7.44  /  pCO2: 32    /  pO2: 64    / HCO3: 22    / Base Excess: -.9   /  SaO2: 93                                      16.6   16.2  )-----------( 170      ( 01 Sep 2017 19:07 )             50.8     09-01    145  |  107  |  19  ----------------------------<  149<H>  3.5   |  22  |  1.11    Ca    8.5      01 Sep 2017 21:33  Phos  3.3     09-01  Mg     1.8     09-01    TPro  7.3  /  Alb  3.7  /  TBili  0.7  /  DBili  x   /  AST  24  /  ALT  20  /  AlkPhos  69  09-01    PT/INR - ( 01 Sep 2017 22:48 )   PT: 12.6 sec;   INR: 1.16 ratio         PTT - ( 01 Sep 2017 22:48 )  PTT:27.0 sec  MEDS:  MEDICATIONS  (STANDING):  morphine  Infusion 1 mG/Hr (1 mL/Hr) IV Continuous <Continuous>      [All pertinent recent Imaging/Reports reviewed]    DEVICES: [] Restraints [] JENELLE/HMV []LD [] ET tube [] Trach [] A-line [] King [] NGT [] Rectal Tube   Mode: AC/ CMV (Assist Control/ Continuous Mandatory Ventilation)  RR (machine): 14  TV (machine): 450  FiO2: 50  PEEP: 8  ITime: 1  MAP: 13  PIP: 28      PHYSICAL EXAM:    Constitutional: on no Sedated   Extubated.  Neurological:  no sedation; Does not respond to commands. pupils  L eye 2 mm sluggish R eye 1.5 mm sluggish; no regard to threat R eye yet regards to threat  L eye; dolls ; roving eye movements ; cough and gag present     Motor exam:          Upper extremity                         Delt     Bicep     Tricep    HG                                                 R         Ext posturing to noxious stimuli                                               L          moves LUE spontaneously          Lower extremity                        HF         KF        KE       DF         PF                                                  R          Extensor and rigid to passive movemnet                                                L           Rigid to passive movement                                                  Sensation: No grimacing to noxious      Pulmonary: Crackles at bases      Cardiovascular: S1, S2, Regular rate and rhythm     Gastrointestinal: Soft, Non-tender, Non-distended       EXT - DP - 2+ B/L SUMMARY:HPI:  This is a 71yo M from [facility] with PMHx of HTN and Parkinson's with b/l DBS who was transferred from OSH due to ICH on CT head. Pt's last known normal was yesterday evening. Pt fell back and out of chair yesterday while out on a beach. Pt had no LOC, and no issues after. Went to sleep as normal, but about 2AM family reports that pt didn't seem like himself. Family tried waking him up at 7AM and reports he was unarousable. Pt was taken to [facility] where he received a CT head non-contrast which showed Grade 3 ICH with significant intraventricular blood and hydrocephalus. Pt was transferred to [facility]. In the ED, pt's GCS was 6T. EVD was placed and pt was found to have high opening pressure at about 93miF1M   9/2 EVD AT 10 mmhg, had cuff leak, ET tube exchanged. After discussion with the family, DNR and DNI status of the patient documented, comfort care measures only. Extubated, maintains his airways.  EVD removed 9/3.    ADMISSION SCORES:   GCS: 8 T    ICH score 3:    Overnight Events:     ROS: negative [] unable to obtain as patient is comatose/intubated/aphasic []   VITALS:   T(C): 36.8 (09-02-17 @ 19:00), Max: 37.1 (09-02-17 @ 11:00)  HR: 93 (09-03-17 @ 01:30) (66 - 93)  BP: --  RR: 22 (09-03-17 @ 01:30) (15 - 23)  SpO2: 97% (09-03-17 @ 01:30) (86% - 100%)    09-02-17 @ 07:01  -  09-03-17 @ 07:00  --------------------------------------------------------  IN: 841 mL / OUT: 524 mL / NET: 317 mL      LABS:  ABG - ( 02 Sep 2017 15:49 )  pH: 7.44  /  pCO2: 32    /  pO2: 64    / HCO3: 22    / Base Excess: -.9   /  SaO2: 93                                      16.6   16.2  )-----------( 170      ( 01 Sep 2017 19:07 )             50.8     09-01    145  |  107  |  19  ----------------------------<  149<H>  3.5   |  22  |  1.11    Ca    8.5      01 Sep 2017 21:33  Phos  3.3     09-01  Mg     1.8     09-01    TPro  7.3  /  Alb  3.7  /  TBili  0.7  /  DBili  x   /  AST  24  /  ALT  20  /  AlkPhos  69  09-01    PT/INR - ( 01 Sep 2017 22:48 )   PT: 12.6 sec;   INR: 1.16 ratio         PTT - ( 01 Sep 2017 22:48 )  PTT:27.0 sec  MEDS:  MEDICATIONS  (STANDING):  morphine  Infusion 1 mG/Hr (1 mL/Hr) IV Continuous <Continuous>      [All pertinent recent Imaging/Reports reviewed]    DEVICES: [] Restraints [] JENELLE/HMV []LD [] ET tube [] Trach [] A-line [] King [] NGT [] Rectal Tube   Mode: AC/ CMV (Assist Control/ Continuous Mandatory Ventilation)  RR (machine): 14  TV (machine): 450  FiO2: 50  PEEP: 8  ITime: 1  MAP: 13  PIP: 28      PHYSICAL EXAM:    Constitutional: on no Sedated   Extubated. No signs of respiratory distress.  Neurological:  no sedation; Does not respond to commands. pupils  L eye 2 mm sluggish R eye 1.5 mm sluggish; no regard to threat R eye yet regards to threat  L eye; dolls ; roving eye movements ; cough and gag present     Motor exam:          Upper extremity                         Delt     Bicep     Tricep    HG                                                 R         Ext posturing to noxious stimuli                                               L          moves LUE spontaneously          Lower extremity                        HF         KF        KE       DF         PF                                                  R          Extensor and rigid to passive movemnet                                                L           Rigid to passive movement                                                  Sensation: No grimacing to noxious      Pulmonary: Crackles at bases      Cardiovascular: S1, S2, Regular rate and rhythm     Gastrointestinal: Soft, Non-tender, Non-distended       EXT - DP - 2+ B/L

## 2017-09-03 NOTE — PROGRESS NOTE ADULT - ASSESSMENT
72M PMH parkinsons s/p b/l DBP ADM 9/1 with L thalamic ICH s/p EVD.     -Family discussed DNR and comfort care only  -Will transfer to PCU when bed available

## 2017-09-03 NOTE — CONSULT NOTE ADULT - ATTENDING COMMENTS
Agree with above. Transfer to PCU once bed is available.
Patient now on palliative care. No need for Parkinson's medications. Patient not personally seen after discussion with primary team.

## 2017-09-03 NOTE — PROGRESS NOTE ADULT - ATTENDING COMMENTS
I saw and evaluated the patient. I reviewed the resident's note and agree. 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 The patient was intubated, not opening eyes, localizing on the left, and extensor posturing on the right, with a GCS of 7T (E1V1M5). Per the family's wishes, the patient was terminally extubated and the external ventricular drain was removed. The patient is now DNR and comfort measures only. I saw and evaluated the patient. I reviewed the resident's note and agree. 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 The patient was intubated, not opening eyes, localizing on the left, and extensor posturing on the right, with a GCS of 7T (E1V1M5). Per the family's wishes, the patient was extubated and the external ventricular drain was removed. The patient is now DNR and comfort measures only.

## 2017-09-03 NOTE — PROGRESS NOTE ADULT - SUBJECTIVE AND OBJECTIVE BOX
GABRIEL [id number]      Drain type: EVD     Patient's position while drain removed: Supine     Patient tolerated well. No complications.    Exit Site secured with 2 staples     Additional Info: Removed for comfort care measures.

## 2017-09-04 NOTE — PROGRESS NOTE ADULT - PROBLEM SELECTOR PLAN 5
DNR/DNI. Family understands pt is dying with prognosis of hours to days, and agreed to transfer to PCU to focus on pt's comfort.

## 2017-09-04 NOTE — PROGRESS NOTE ADULT - PROBLEM SELECTOR PLAN 1
s/p elective extubation, actively dying, no plans for further invasive management. Morphine prn for dyspnea, on morphine gtt 3mg/h currently.

## 2017-09-04 NOTE — PROGRESS NOTE ADULT - ATTENDING COMMENTS
Agree with above. Ongoing symptomatic management. Daughter Flower requesting that patient be brought back to [facility]- email sent to insurance company, who is still requesting medical records.

## 2017-09-04 NOTE — PROGRESS NOTE ADULT - ASSESSMENT
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 As per family and primary team notes, pt is visiting family from [facility] where he lives primarily. Pt was at the beach with family when he fell backwards out of his chair with change in mental status. Pt's mental status continued to worsen, so pt was taken to [facility] where left thalamic ICH was found; pt transferred to [facility] for neurosurg eval. Pt is s/p EVD, however pt did not have improvement in neuro status. Pt was downgraded from Neurosurg ICU today. Palliative called for end of life care and sxs management, now on comfort care in the PCU.

## 2017-09-04 NOTE — PROGRESS NOTE ADULT - PROBLEM SELECTOR PLAN 6
Discussed with patient's daughters re: transfer to [place]. Family understand his grave prognosis and that he may die en[place] to [place], but are willing to take this risk, and have an ambulance service set up. They require further documentation from medical records, which will be able to assist them tomorrow. Will attempt to discuss with insurance company in [facility] today to help start case to transfer patient over, family aware patient may pass away in PCU and be transferred to [facility] posthumously.

## 2017-09-04 NOTE — PROGRESS NOTE ADULT - SUBJECTIVE AND OBJECTIVE BOX
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 As per family and primary team notes, pt is visiting family from [facility] where he lives primarily. Pt was at the beach with family when he fell backwards out of his chair with change in mental status. Pt's mental status continued to worsen, so pt was taken to [facility] where left thalamic ICH was found; pt transferred to [facility] for neurosurg eval. Pt is s/p EVD, however pt did not have improvement in neuro status. Pt was downgraded from Neurosurg ICU today. Palliative called for end of life care and sxs management, transferred to PCU 9/3 for comfort care.     Indication for Palliative Care Unit Services: comfort care    ADVANCE DIRECTIVES:    DNR [x ] YES [ ] NO                            [x ] Completed  MOLST  [ ] YES [x ] NO                      [ ] Completed  Health Care Proxy [ ] YES  [x] NO   [ ] Completed  Living Will  [ ] YES [x] NO             [x ] Surrogate  [ ] HCP  [ ] Guardian:     Daughter Lisseth                                                     Phone#:    Allergies    No Known Allergies    Intolerances      MEDICATIONS  (STANDING):  artificial  tears Solution 1 Drop(s) Both EYES three times a day  glycopyrrolate Injectable 0.4 milliGRAM(s) IV Push every 6 hours  morphine  Infusion 3 mG/Hr (3 mL/Hr) IV Continuous <Continuous>  sodium chloride 0.9%. 1000 milliLiter(s) (10 mL/Hr) IV Continuous <Continuous>    MEDICATIONS  (PRN):  acetaminophen  Suppository 650 milliGRAM(s) Rectal every 6 hours PRN For Temp greater than 38 C (100.4 F)  morphine  - Injectable 6 milliGRAM(s) IV Push every 1 hour PRN pain  morphine  - Injectable 6 milliGRAM(s) IV Push every 1 hour PRN dyspnea  LORazepam   Injectable 0.5 milliGRAM(s) IV Push every 6 hours PRN Agitation      PRESENT SYMPTOMS:  Source: [ ] Patient   [ ] Family   [ x] Team     Pain:                        [x ] No [ ] Yes             [ ] Mild [ ] Moderate [ ] Severe    Onset -  Location -  Duration -  Character -  Alleviating/Aggravating -  Radiation -  Timing -      Dyspnea:                [ ] No [ x] Yes             [ ] Mild [x ] Moderate [ ] Severe    Anxiety:                  [ x] No [ ] Yes             [ ] Mild [ ] Moderate [ ] Severe    Fatigue:                  [ ] No [ x] Yes             [ ] Mild [ ] Moderate [x ] Severe    Nausea:                  [x ] No [ ] Yes             [ ] Mild [ ] Moderate [ ] Severe    Loss of appetite:   [ ] No [ ] Yes             [ ] Mild [ ] Moderate [ ] Severe    Constipation:        [ ] No [ ] Yes             [ ] Mild [ ] Moderate [ ] Severe    Other Symptoms:  [ ] All other review of systems negative   [x ] Unable to obtain due to poor mentation     Karnofsky Performance Score/Palliative Performance Status Version 2:   10%    PHYSICAL EXAM:  Vital Signs Last 24 Hrs  T(C): 37.1 (04 Sep 2017 07:41), Max: 39.6 (04 Sep 2017 05:52)  T(F): 98.7 (04 Sep 2017 07:41), Max: 103.2 (04 Sep 2017 05:52)  HR: 113 (04 Sep 2017 07:41) (96 - 113)  BP: 125/74 (04 Sep 2017 07:41) (106/70 - 133/62)  BP(mean): --  RR: 16 (04 Sep 2017 07:41) (16 - 24)  SpO2: 85% (04 Sep 2017 07:41) (77% - 85%) I&O's Summary    03 Sep 2017 07:01  -  04 Sep 2017 07:00  --------------------------------------------------------  IN: 159 mL / OUT: 50 mL / NET: 109 mL        General:  [ ] Alert  [ ] Oriented x      [ ] Lethargic  [ ] Agitated   [ ] Cachexia   [ x] Unarousable  [ ] Verbal  [x ] Non-Verbal    HEENT:  [ ] Normal   [x ] Dry mouth   [ ] ET Tube    [ ] Trach  [ ] Oral lesions    Lungs:   [x ] Clear [ ] Tachypnea  [ ] Audible excessive secretions [x] excessive work of breathing  [ ] Rhonchi        [ ] Right [ ] Left [ ] Bilateral  [ ] Crackles        [ ] Right [ ] Left [ ] Bilateral  [ ] Wheezing     [ ] Right [ ] Left [ ] Bilateral  [ ] Respiratory failure [ ] Acute [ ] Chronic [ ] Hypoxemic [ ] Hypercarbic [ ] Other    Cardiovascular:   [x ] Regular [ ] Irregular [ ] Tachycardia   [ ] Bradycardia  [ ] Murmur [ ] Other  [ ] Shock [ ] Septic [ ] Hypovolemic [ ] Neurogenic [ ] Cardiogenic   [ ] Vasopressors [ ] Inotrophs    Abdomen:   [x ] Soft  [ ] Distended   [x ] +BS  [x] Non tender [ ] Tender  [ ]PEG   [ ]OGT/ NGT   Last BM:     [ ] Other    Genitourinary:  [ ] Normal [ ] Incontinent   [ ] Oliguria/Anuria   [ x] King  [ ] Other       Musculoskeletal:    [ ] Normal   [ ] Weakness  [ ] Edema  [x ] Bedbound [ ]  Ambulatory [ ] with [ ] without assistance [x ] Functional quadriplegia    Neurological: [ ] No focal deficits  [x ] Cognitive impairment  [ ] Dysphagia [ ] Dysarthria [ ] Paresis [ ] Other   [ ] Brain compression  [ ] Cerebral edema [x ] Encephalopathy    Skin: [x ] Normal   [ ] Ulcer(s) type [ ] Diabetic [ ] Pressure [ ] Other   Stage        POA [ ]  Yes [ ]  No       [ ] Rash    LABS:  no new labs for review              Protein Calorie Malnutrition: [ ] Mild [ ] Moderate [ ] Severe    Oral Intake: [ ] Unable/mouth care only [ ] Minimal [ ] Moderate [ ] Full Capability  Diet: [ ] NPO [ ] Tube feeds [ ] TPN [ ] Other     RADIOLOGY & ADDITIONAL STUDIES:    REFERRALS:   [ ] Chaplaincy  [ ] Hospice Care  [ ] Child Life  [x ] Social Work  [ ] Case management [ ] Nutrition [ ] Holistic Therapy [ ] Wound Care [ ]Physical Therapy [ ] Other [ ]See goals of care note in [place]

## 2017-09-05 NOTE — DISCHARGE NOTE FOR THE EXPIRED PATIENT - HOSPITAL COURSE
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 As per family and primary team notes, pt is visiting family from [facility] where he lives primarily. Pt was at the beach with family when he fell backwards out of his chair with change in mental status. Pt's mental status continued to worsen, so pt was taken to [facility] where left thalamic ICH was found; pt transferred to [facility] for neurosurg eval.  Patient had extra ventricular drainage, however pt did not have improvement in neuro status.  Goals of care discussion was had with family, patient was made DNR, patient was extubated  and extraventricular drain removed.  Patient was transferred to PCU for comfort care.  Patient  19.  Medical examiner was contacted as patient had fall, per medical examiner Elinor, intrathalamic hemorrhage likely 2/2 hypertension and not fall.

## 2017-09-06 LAB
CULTURE RESULTS: SIGNIFICANT CHANGE UP
CULTURE RESULTS: SIGNIFICANT CHANGE UP
SPECIMEN SOURCE: SIGNIFICANT CHANGE UP
SPECIMEN SOURCE: SIGNIFICANT CHANGE UP

## 2021-09-23 NOTE — PROGRESS NOTE ADULT - SUBJECTIVE AND OBJECTIVE BOX
Unable to get through with number provided. Patient Seen and Examined.     Overnight Events: None    T(C): 37.5 (09-02-17 @ 01:00), Max: 39.5 (09-01-17 @ 19:00)  HR: 66 (09-02-17 @ 01:08) (64 - 178)  BP: 137/82 (09-02-17 @ 01:00) (103/77 - 173/101)  RR: 23 (09-02-17 @ 01:00) (18 - 29)  SpO2: 99% (09-02-17 @ 01:08) (91% - 100%)    Exam:   extubated, EO to voice, not FC  pupils fixed  RUE extensor, LUE spontaneous  RLE and LLE TF    EVD@10    propofol Infusion 5 MICROgram(s)/kG/Min IV Continuous <Continuous>  acetaminophen    Suspension 905 milliGRAM(s) Oral every 6 hours PRN  acetaminophen    Suspension. 650 milliGRAM(s) Oral every 6 hours PRN  ondansetron Injectable 4 milliGRAM(s) IV Push every 6 hours PRN  docusate sodium 100 milliGRAM(s) Oral three times a day  senna 2 Tablet(s) Oral at bedtime PRN  levETIRAcetam  IVPB 500 milliGRAM(s) IV Intermittent every 12 hours  chlorhexidine 0.12% Liquid 15 milliLiter(s) Swish and Spit two times a day  niCARdipine Infusion 5 mG/Hr IV Continuous <Continuous>  artificial  tears Solution 1 Drop(s) Both EYES every 4 hours  pantoprazole  Injectable 40 milliGRAM(s) IV Push daily  sodium chloride 0.9% with potassium chloride 20 mEq/L 1000 milliLiter(s) IV Continuous <Continuous>                            16.6   16.2  )-----------( 170      ( 01 Sep 2017 19:07 )             50.8     09-01    145  |  107  |  19  ----------------------------<  149<H>  3.5   |  22  |  1.11    Ca    8.5      01 Sep 2017 21:33  Phos  3.3     09-01  Mg     1.8     09-01    TPro  7.3  /  Alb  3.7  /  TBili  0.7  /  DBili  x   /  AST  24  /  ALT  20  /  AlkPhos  69  09-01    PT/INR - ( 01 Sep 2017 22:48 )   PT: 12.6 sec;   INR: 1.16 ratio         PTT - ( 01 Sep 2017 22:48 )  PTT:27.0 sec

## 2022-10-25 NOTE — PATIENT PROFILE ADULT. - HAS THE PATIENT HAD A SIGNIFICANT CHANGE IN FUNCTIONAL STATUS DUE TO CVA, HEAD TRAUMA, ORTHOPEDIC TRAUMA/SURGERY, OR FALL, WITH THE WEEK PRIOR TO ADMISSION
[FreeTextEntry3] : I, Israel Sandy, acted as a scribe on behalf of Dr. Rupinder Vazquez on 10/17/2022 .\par \par All medical entries made by the scribe were at my, Dr. Rupinder Vazquez's, direction and personally dictated by me on 10/17/2022. I have reviewed the chart and agree that the record accurately reflects my personal performance of the history, physical exam, assessment and plan. I have also personally directed, reviewed, and agreed with the chart. yes

## 2023-11-02 NOTE — DISCHARGE NOTE ADULT - NS AS DC STROKE ED MATERIALS
Call 911 for Stroke/Risk Factors for Stroke/Prescribed Medications/Stroke Warning Signs and Symptoms/Stroke Education Booklet/Need for Followup After Discharge Labs/EKG/Imaging Studies/Medications